# Patient Record
Sex: MALE | Race: WHITE | NOT HISPANIC OR LATINO | Employment: FULL TIME | URBAN - METROPOLITAN AREA
[De-identification: names, ages, dates, MRNs, and addresses within clinical notes are randomized per-mention and may not be internally consistent; named-entity substitution may affect disease eponyms.]

---

## 2020-03-24 ENCOUNTER — VIRTUAL VISIT (OUTPATIENT)
Dept: FAMILY MEDICINE | Facility: OTHER | Age: 40
End: 2020-03-24

## 2020-03-24 NOTE — PROGRESS NOTES
"Date: 2020 09:06:44  Clinician: Lupe Wilson  Clinician NPI: [id number]  Patient: Isabel Argueta  Patient : 1980  Patient Address: [facility] [address], [place] [address]  Patient Phone: [phone]  Visit Protocol: URI  Patient Summary:  Isabel is a 39 year old ( : 1980 ) male who initiated a Visit for COVID-19 (Coronavirus) evaluation and screening. When asked the question \"Please sign me up to receive news, health information and promotions from [employer].\", Isabel responded \"No\".    Isabel states his symptoms started 1-2 days ago.   His symptoms consist of rhinitis, a sore throat, a cough, nasal congestion, and malaise.   Symptom details     Nasal secretions: The color of his mucus is green.    Cough: Isabel coughs every 5-10 minutes and his cough is not more bothersome at night. Phlegm comes into his throat when he coughs. He believes his cough is caused by post-nasal drip. The color of the phlegm is clear, green, and yellow.     Sore throat: Isabel reports having moderate throat pain (4-6 on a 10 point pain scale), does not have exudate on his tonsils, and can swallow liquids. The lymph nodes in his neck are not enlarged. A rash has not appeared on the skin since the sore throat started.      Isabel denies having ear pain, enlarged lymph nodes, facial pain or pressure, myalgias, wheezing, chills, teeth pain, headache, and fever. He also denies taking antibiotic medication for the symptoms and having recent facial or sinus surgery in the past 60 days. He is not experiencing dyspnea.   Precipitating events  Isabel is not sure if he has been exposed to someone with strep throat. He has not recently been exposed to someone with influenza. Isabel has been in close contact with the following high risk individuals: children under the age of 5.   Pertinent COVID-19 (Coronavirus) information  Isabel has not traveled internationally or to the areas where COVID-19 (Coronavirus) is widespread, including " cruise ship travel in the last 14 days before the start of his symptoms.   Isabel has not had a close contact with a laboratory-confirmed COVID-19 patient within 14 days of symptom onset. He also has not had a close contact with a suspected COVID-19 patient within 14 days of symptom onset.   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   Pertinent medical history  Isabel needs a return to work/school note.   Weight: 220 lbs   Isabel does not smoke or use smokeless tobacco.   Additional information as reported by the patient (free text): My chest hurts when I cough   Weight: 220 lbs    MEDICATIONS: Prilosec oral, ALLERGIES: NKDA  Clinician Response:  Dear Isabel,   Based on the information you have provided, you do have symptoms that are consistent with Coronavirus (COVID-19).   The coronavirus causes mild to severe respiratory illness with the most common symptoms including fever, cough and difficulty breathing. Unfortunately, many viruses cause similar symptoms and it can be difficult to distinguish between viruses, especially in mild cases, so we are presuming that anyone with cough or fever has coronavirus at this time.  Coronavirus/COVID-19 has reached the point of community spread in [place], meaning that we are finding the virus in people with no known exposure risk for estefanía the virus. Given the increasing commonness of coronavirus in the community we are no longer testing patients who are not critically ill.  If you are a health care worker, you should refer to your employee health office for instructions about testing and returning to work.  For everyone else who has cough or fever, you should assume you are infected with coronavirus. Since you will not be tested but have symptoms that may be consistent with coronavirus, the CDC recommends you stay in self-isolation until these three things have happened:    You have had no fever for at least 72 hours  (that is three full days of no fever without the use of medicine that reduces fevers)    AND   Other symptoms have improved (for example, when your cough or shortness of breath have improved)   AND   At least 7 days have passed since your symptoms first appeared.   How to Isolate:    Isolate yourself at home.   Do Not allow any visitors  Do Not go to work or school  Do Not go to [employer],  centers, shopping, or other public places.  Do Not shake hands.  Avoid close contact with others (hugging, kissing).   Protect Others:    Cover Your Mouth and Nose with a mask, disposable tissue or wash cloth to avoid spreading germs to others.  Wash your hands and face frequently with soap and water.   Managing Symptoms:    At this time, we primarily recommend Tylenol (Acetaminophen) for fever or pain. If you have liver or kidney problems, contact your primary care provider for instructions on use of tylenol. Adults can take 650 mg (two 325 mg pills) by mouth every 4-6 hours as needed OR 1,000 mg (two 500 mg pills) every 8 hours as needed. MAXIMUM DAILY DOSE: 3,000mg. For children, refer to dosing on bottle based on age or weight.   If you develop significant shortness of breath that prevents you from doing normal activities, please call 911 or proceed to the nearest emergency room and alert them immediately that you have been in self-isolation for possible coronavirus.   For more information about COVID19 and options for caring for yourself at home, please visit the CDC website at https://www.cdc.gov/coronavirus/2019-ncov/about/steps-when-sick.htmlFor more options for care at [facility], please visit our website at [email or web address]     Diagnosis: Cough  Diagnosis ICD: R05

## 2020-03-25 ENCOUNTER — RESULTS ONLY (OUTPATIENT)
Dept: LAB | Age: 40
End: 2020-03-25

## 2020-03-25 ENCOUNTER — OFFICE VISIT (OUTPATIENT)
Dept: URGENT CARE | Facility: URGENT CARE | Age: 40
End: 2020-03-25

## 2020-03-25 DIAGNOSIS — Z20.822 SUSPECTED COVID-19 VIRUS INFECTION: Primary | ICD-10-CM

## 2020-03-25 PROCEDURE — 99207 ZZC NO BILLABLE SERVICE THIS VISIT: CPT

## 2020-03-25 NOTE — PATIENT INSTRUCTIONS
[facility] Employee Health team will receive your Covid 19 test results in the next 1-4 days.  Once your results are received, Employee Health will call you with your test result and discuss your Return to Work timeline and criteria.

## 2020-03-27 LAB
COVID-19 VIRUS PCR TO MAYO - RESULT: NORMAL
SPECIMEN SOURCE: NORMAL

## 2020-04-28 LAB
SARS-COV-2 RNA SPEC QL NAA+PROBE: ABNORMAL
SPECIMEN SOURCE: ABNORMAL

## 2020-12-20 ENCOUNTER — HEALTH MAINTENANCE LETTER (OUTPATIENT)
Age: 40
End: 2020-12-20

## 2021-03-11 ENCOUNTER — OFFICE VISIT (OUTPATIENT)
Dept: FAMILY MEDICINE | Facility: CLINIC | Age: 41
End: 2021-03-11
Payer: COMMERCIAL

## 2021-03-11 VITALS
HEART RATE: 86 BPM | OXYGEN SATURATION: 97 % | DIASTOLIC BLOOD PRESSURE: 87 MMHG | SYSTOLIC BLOOD PRESSURE: 135 MMHG | TEMPERATURE: 97 F | WEIGHT: 226 LBS

## 2021-03-11 DIAGNOSIS — R00.2 PALPITATIONS: Primary | ICD-10-CM

## 2021-03-11 DIAGNOSIS — K21.9 GASTROESOPHAGEAL REFLUX DISEASE WITHOUT ESOPHAGITIS: ICD-10-CM

## 2021-03-11 DIAGNOSIS — Z13.220 LIPID SCREENING: ICD-10-CM

## 2021-03-11 PROCEDURE — 80061 LIPID PANEL: CPT | Performed by: FAMILY MEDICINE

## 2021-03-11 PROCEDURE — 36415 COLL VENOUS BLD VENIPUNCTURE: CPT | Performed by: FAMILY MEDICINE

## 2021-03-11 PROCEDURE — 99204 OFFICE O/P NEW MOD 45 MIN: CPT | Performed by: FAMILY MEDICINE

## 2021-03-11 PROCEDURE — 80053 COMPREHEN METABOLIC PANEL: CPT | Performed by: FAMILY MEDICINE

## 2021-03-11 PROCEDURE — 93000 ELECTROCARDIOGRAM COMPLETE: CPT | Performed by: FAMILY MEDICINE

## 2021-03-11 RX ORDER — OMEPRAZOLE 20 MG/1
20 TABLET, DELAYED RELEASE ORAL DAILY
COMMUNITY
End: 2021-03-11

## 2021-03-11 RX ORDER — OMEPRAZOLE 20 MG/1
20 TABLET, DELAYED RELEASE ORAL DAILY
Qty: 30 TABLET | Refills: 11 | Status: SHIPPED | OUTPATIENT
Start: 2021-03-11 | End: 2021-10-19

## 2021-03-11 SDOH — HEALTH STABILITY: MENTAL HEALTH: HOW OFTEN DO YOU HAVE A DRINK CONTAINING ALCOHOL?: NEVER

## 2021-03-11 SDOH — HEALTH STABILITY: MENTAL HEALTH: HOW OFTEN DO YOU HAVE 6 OR MORE DRINKS ON ONE OCCASION?: NEVER

## 2021-03-11 SDOH — HEALTH STABILITY: MENTAL HEALTH: HOW MANY STANDARD DRINKS CONTAINING ALCOHOL DO YOU HAVE ON A TYPICAL DAY?: NOT ASKED

## 2021-03-11 NOTE — PROGRESS NOTES
"    Assessment & Plan     Palpitations  Palpitations with arrhythmia Differential Dx includes atrial fibrillation and V. tach supraventricular tachycardia or premature ventricular contractions.  probability of a cardiac or coronary artery disease related arrhythmia is very low because of: atypical symptoms, age, lack of other lifestyle risk factors. Also patient can exercise strenuously without problems  I lean towards premature ventricular contractions as a diagnosis because of normal electrocardiogram and exam today    We will do electrolytes as a follow-up     - Comprehensive metabolic panel  - EKG 12-lead complete w/read - Clinics  - Leadless EKG Monitor 3 to 7 Days; Future    Lipid screening    - Lipid Profile (Chol, Trig, HDL, LDL calc)    Gastroesophageal reflux disease without esophagitis  Ok for intermittent use of omeprazole    - omeprazole (PRILOSEC OTC) 20 MG EC tablet; Take 1 tablet (20 mg) by mouth daily            Humberto Lopez MD  [facility]    Suha Hall is a 40 year old who presents for the following health issues     HPI       Chest Pain/Palpitations  Onset/Duration: 1 year  Description:   Location: entire chest  Progression of Symptoms: intermittent  Accompanying Signs & Symptoms:  Shortness of breath: no  Sweating: no  Nausea/vomiting: no  Lightheadedness: no  Palpitations: YES  Fever/Chills: no  Cough: no           Heartburn: no  Precipitating factors:   Worse with exertion: YES  Worse with deep breaths: no           Related to eating: no           Better with burping: no    Was looking on watch EKG and saw a \"large wave\"  Showed him a PVC example and that matches    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      Objective    There were " no vitals taken for this visit.  There is no height or weight on file to calculate BMI.  Physical Exam     General Appearance: Pleasant, alert, in no acute respiratory distress.  Psychiatric Exam: Alert - appropriate, normal affect    Symptoms at time of EKG: None   Rhythm: Normal sinus   Rate: Normal  Axis: Normal  Ectopy: None  Conduction: Normal  ST Segments/ T Waves: No ST-T wave changes and No acute ischemic changes  Q Waves: None  Comparison to prior: No old EKG available    Clinical Impression: normal EKG

## 2021-03-11 NOTE — NURSING NOTE
Chief Complaint   Patient presents with     Heart Problem     palpitations for about a year     initial /87 (BP Location: Right arm, Cuff Size: Adult Large)   Pulse 86   Temp 97  F (36.1  C) (Tympanic)   Wt 102.5 kg (226 lb)   SpO2 97%  There is no height or weight on file to calculate BMI.  BP completed using cuff size: large.   R arm      Health Maintenance that is potentially due pending provider review:  NONE    n/a    Hammad Osorio ma

## 2021-03-12 ENCOUNTER — MYC MEDICAL ADVICE (OUTPATIENT)
Dept: FAMILY MEDICINE | Facility: CLINIC | Age: 41
End: 2021-03-12

## 2021-03-12 LAB
ALBUMIN SERPL-MCNC: 4.4 G/DL (ref 3.4–5)
ALP SERPL-CCNC: 63 U/L (ref 40–150)
ALT SERPL W P-5'-P-CCNC: 112 U/L (ref 0–70)
ANION GAP SERPL CALCULATED.3IONS-SCNC: 3 MMOL/L (ref 3–14)
AST SERPL W P-5'-P-CCNC: 37 U/L (ref 0–45)
BILIRUB SERPL-MCNC: 0.5 MG/DL (ref 0.2–1.3)
BUN SERPL-MCNC: 19 MG/DL (ref 7–30)
CALCIUM SERPL-MCNC: 9.1 MG/DL (ref 8.5–10.1)
CHLORIDE SERPL-SCNC: 107 MMOL/L (ref 94–109)
CHOLEST SERPL-MCNC: 215 MG/DL
CO2 SERPL-SCNC: 29 MMOL/L (ref 20–32)
CREAT SERPL-MCNC: 1.18 MG/DL (ref 0.66–1.25)
GFR SERPL CREATININE-BSD FRML MDRD: 77 ML/MIN/{1.73_M2}
GLUCOSE SERPL-MCNC: 82 MG/DL (ref 70–99)
HDLC SERPL-MCNC: 28 MG/DL
LDLC SERPL CALC-MCNC: ABNORMAL MG/DL
NONHDLC SERPL-MCNC: 187 MG/DL
POTASSIUM SERPL-SCNC: 3.8 MMOL/L (ref 3.4–5.3)
PROT SERPL-MCNC: 7.9 G/DL (ref 6.8–8.8)
SODIUM SERPL-SCNC: 139 MMOL/L (ref 133–144)
TRIGL SERPL-MCNC: 472 MG/DL

## 2021-03-15 ENCOUNTER — ANCILLARY PROCEDURE (OUTPATIENT)
Dept: CARDIOLOGY | Facility: CLINIC | Age: 41
End: 2021-03-15
Attending: FAMILY MEDICINE
Payer: COMMERCIAL

## 2021-03-15 DIAGNOSIS — R00.2 PALPITATIONS: ICD-10-CM

## 2021-03-15 PROCEDURE — 93244 EXT ECG>48HR<7D REV&INTERPJ: CPT | Performed by: INTERNAL MEDICINE

## 2021-03-15 PROCEDURE — 93242 EXT ECG>48HR<7D RECORDING: CPT

## 2021-03-15 NOTE — PROGRESS NOTES
Per Dr. Lopez, patient to have 3 day Ziopatch monitor placed.  Diagnosis: palpitations  Monitor placed: Yes  Patient Instructed: Yes  Patient verbalized understanding: Yes  Holter # [id number]    Placed By Paty Cueto

## 2021-03-25 NOTE — PROGRESS NOTES
SUBJECTIVE:   CC: Isabel Argueta is an 40 year old male who presents for preventative health visit.     Patient has been advised of split billing requirements and indicates understanding: Yes  Healthy Habits:     Getting at least 3 servings of Calcium per day:  Yes    Bi-annual eye exam:  Yes    Dental care twice a year:  Yes    Sleep apnea or symptoms of sleep apnea:  None    Diet:  Other    Frequency of exercise:  2-3 days/week    Duration of exercise:  30-45 minutes    Taking medications regularly:  Yes    Medication side effects:  Not applicable    PHQ-2 Total Score: 0    Additional concerns today:  Yes      High triglycerides  Discussed dietary interventions  Recheck at the end of Ramadan    Today's PHQ-2 Score:   PHQ-2 ( 1999 Pfizer) 3/23/2021   Q1: Little interest or pleasure in doing things 0   Q2: Feeling down, depressed or hopeless 0   PHQ-2 Score 0   Q1: Little interest or pleasure in doing things Not at all   Q2: Feeling down, depressed or hopeless Not at all   PHQ-2 Score 0       Abuse: Current or Past(Physical, Sexual or Emotional)- No  Do you feel safe in your environment? Yes    Have you ever done Advance Care Planning? (For example, a Health Directive, POLST, or a discussion with a medical provider or your loved ones about your wishes):     Social History     Tobacco Use     Smoking status: Never Smoker     Smokeless tobacco: Never Used   Substance Use Topics     Alcohol use: Never     Frequency: Never     Binge frequency: Never     If you drink alcohol do you typically have >3 drinks per day or >7 drinks per week? Not applicable    No flowsheet data found.    Last PSA: No results found for: PSA    Reviewed orders with patient. Reviewed health maintenance and updated orders accordingly - Yes  Lab work is in process  Labs reviewed in EPIC  BP Readings from Last 3 Encounters:   03/26/21 123/81   03/11/21 135/87    Wt Readings from Last 3 Encounters:   03/26/21 102.6 kg (226 lb 3.2 oz)   03/11/21  "102.5 kg (226 lb)                  Patient Active Problem List   Diagnosis     Hypertriglyceridemia     No past surgical history on file.    Social History     Tobacco Use     Smoking status: Never Smoker     Smokeless tobacco: Never Used   Substance Use Topics     Alcohol use: Never     Frequency: Never     Binge frequency: Never     No family history on file.      Current Outpatient Medications   Medication Sig Dispense Refill     Omega-3 Fatty Acids (FISH OIL PO)        omeprazole (PRILOSEC OTC) 20 MG EC tablet Take 1 tablet (20 mg) by mouth daily 30 tablet 11       Reviewed and updated as needed this visit by clinical staff  Tobacco  Allergies  Meds              Reviewed and updated as needed this visit by Provider                    Review of Systems  10 point ROS of systems including Constitutional, Eyes, Respiratory, Cardiovascular, Gastroenterology, Genitourinary, Integumentary, Muscularskeletal, Psychiatric were all negative except for pertinent positives noted in my HPI.      OBJECTIVE:   /81   Pulse 66   Temp 96.5  F (35.8  C) (Tympanic)   Ht 1.842 m (6' 0.5\")   Wt 102.6 kg (226 lb 3.2 oz)   SpO2 99%   BMI 30.26 kg/m      Physical Exam    General Appearance: Pleasant, alert, in no acute respiratory distress.  Head Exam: Normal. Normocephalic, atraumatic. No sinus tenderness.  Eye Exam: Normal external eye, conjunctiva, lids. CASIE.  Ear Exam: Normal auditory canals and external ears. Non-tender.  Left TM-normal. Right TM-normal.  Neck Exam: Supple, no obvious thyroid enlargement  Chest/Respiratory Exam: Normal, comfortable, easy respirations. Chest wall normal. Lungs are clear to auscultation. No wheezing, crackles, or rhonchi.  Cardiovascular Exam: Regular rate and rhythm. No murmur, gallop, or rubs.  Musculoskeletal Exam: Back is non-tender, full ROM of upper and lower extremities.  Skin: no rash, warm and dry.    Neurologic Exam: Nonfocal, no tremor. Normal gait.  Psychiatric Exam: Alert " "- appropriate, normal affect      ASSESSMENT/PLAN:       ICD-10-CM    1. Routine general medical examination at a health care facility  Z00.00    2. Hypertriglyceridemia  E78.1 Lipid Profile (Chol, Trig, HDL, LDL calc)     **Comprehensive metabolic panel FUTURE 1yr       Patient has been advised of split billing requirements and indicates understanding: Yes  COUNSELING:   Reviewed preventive health counseling, as reflected in patient instructions       Regular exercise       Healthy diet/nutrition    Estimated body mass index is 30.26 kg/m  as calculated from the following:    Height as of this encounter: 1.842 m (6' 0.5\").    Weight as of this encounter: 102.6 kg (226 lb 3.2 oz).         He reports that he has never smoked. He has never used smokeless tobacco.      Counseling Resources:  ATP IV Guidelines  Pooled Cohorts Equation Calculator  FRAX Risk Assessment  ICSI Preventive Guidelines  Dietary Guidelines for Americans, 2010  USDA's MyPlate  ASA Prophylaxis  Lung CA Screening    Humberto Lopez MD  [facility]  "

## 2021-03-26 ENCOUNTER — OFFICE VISIT (OUTPATIENT)
Dept: FAMILY MEDICINE | Facility: CLINIC | Age: 41
End: 2021-03-26
Payer: COMMERCIAL

## 2021-03-26 VITALS
OXYGEN SATURATION: 99 % | HEART RATE: 66 BPM | TEMPERATURE: 96.5 F | BODY MASS INDEX: 29.98 KG/M2 | DIASTOLIC BLOOD PRESSURE: 81 MMHG | HEIGHT: 73 IN | SYSTOLIC BLOOD PRESSURE: 123 MMHG | WEIGHT: 226.2 LBS

## 2021-03-26 DIAGNOSIS — Z00.00 ROUTINE GENERAL MEDICAL EXAMINATION AT A HEALTH CARE FACILITY: Primary | ICD-10-CM

## 2021-03-26 DIAGNOSIS — E78.1 HYPERTRIGLYCERIDEMIA: ICD-10-CM

## 2021-03-26 PROCEDURE — 99395 PREV VISIT EST AGE 18-39: CPT | Performed by: FAMILY MEDICINE

## 2021-03-26 ASSESSMENT — MIFFLIN-ST. JEOR: SCORE: 1986.98

## 2021-03-31 ENCOUNTER — MYC MEDICAL ADVICE (OUTPATIENT)
Dept: FAMILY MEDICINE | Facility: CLINIC | Age: 41
End: 2021-03-31

## 2021-04-29 ENCOUNTER — MYC MEDICAL ADVICE (OUTPATIENT)
Dept: FAMILY MEDICINE | Facility: CLINIC | Age: 41
End: 2021-04-29

## 2021-05-07 ENCOUNTER — MYC MEDICAL ADVICE (OUTPATIENT)
Dept: FAMILY MEDICINE | Facility: CLINIC | Age: 41
End: 2021-05-07

## 2021-05-07 DIAGNOSIS — E78.1 HYPERTRIGLYCERIDEMIA: ICD-10-CM

## 2021-05-07 LAB
ALBUMIN SERPL-MCNC: 4 G/DL (ref 3.4–5)
ALP SERPL-CCNC: 59 U/L (ref 40–150)
ALT SERPL W P-5'-P-CCNC: 116 U/L (ref 0–70)
ANION GAP SERPL CALCULATED.3IONS-SCNC: 7 MMOL/L (ref 3–14)
AST SERPL W P-5'-P-CCNC: 36 U/L (ref 0–45)
BILIRUB SERPL-MCNC: 0.5 MG/DL (ref 0.2–1.3)
BUN SERPL-MCNC: 18 MG/DL (ref 7–30)
CALCIUM SERPL-MCNC: 8.8 MG/DL (ref 8.5–10.1)
CHLORIDE SERPL-SCNC: 105 MMOL/L (ref 94–109)
CHOLEST SERPL-MCNC: 224 MG/DL
CO2 SERPL-SCNC: 26 MMOL/L (ref 20–32)
CREAT SERPL-MCNC: 0.99 MG/DL (ref 0.66–1.25)
GFR SERPL CREATININE-BSD FRML MDRD: >90 ML/MIN/{1.73_M2}
GLUCOSE SERPL-MCNC: 107 MG/DL (ref 70–99)
HDLC SERPL-MCNC: 25 MG/DL
LDLC SERPL CALC-MCNC: ABNORMAL MG/DL
NONHDLC SERPL-MCNC: 199 MG/DL
POTASSIUM SERPL-SCNC: 3.8 MMOL/L (ref 3.4–5.3)
PROT SERPL-MCNC: 7.6 G/DL (ref 6.8–8.8)
SODIUM SERPL-SCNC: 138 MMOL/L (ref 133–144)
TRIGL SERPL-MCNC: 494 MG/DL

## 2021-05-07 PROCEDURE — 80053 COMPREHEN METABOLIC PANEL: CPT | Performed by: FAMILY MEDICINE

## 2021-05-07 PROCEDURE — 36415 COLL VENOUS BLD VENIPUNCTURE: CPT | Performed by: FAMILY MEDICINE

## 2021-05-07 PROCEDURE — 80061 LIPID PANEL: CPT | Performed by: FAMILY MEDICINE

## 2021-07-21 ENCOUNTER — ALLIED HEALTH/NURSE VISIT (OUTPATIENT)
Dept: NURSING | Facility: CLINIC | Age: 41
End: 2021-07-21
Payer: COMMERCIAL

## 2021-07-21 DIAGNOSIS — Z23 NEED FOR VACCINATION: Primary | ICD-10-CM

## 2021-07-21 PROCEDURE — 99207 PR NO CHARGE NURSE ONLY: CPT

## 2021-07-21 PROCEDURE — 90471 IMMUNIZATION ADMIN: CPT

## 2021-07-21 PROCEDURE — 90715 TDAP VACCINE 7 YRS/> IM: CPT

## 2021-12-19 ENCOUNTER — MYC MEDICAL ADVICE (OUTPATIENT)
Dept: FAMILY MEDICINE | Facility: CLINIC | Age: 41
End: 2021-12-19
Payer: COMMERCIAL

## 2021-12-20 ENCOUNTER — ALLIED HEALTH/NURSE VISIT (OUTPATIENT)
Dept: FAMILY MEDICINE | Facility: CLINIC | Age: 41
End: 2021-12-20
Payer: COMMERCIAL

## 2021-12-20 DIAGNOSIS — Z23 NEED FOR VACCINATION: Primary | ICD-10-CM

## 2021-12-20 PROCEDURE — 90471 IMMUNIZATION ADMIN: CPT

## 2021-12-20 PROCEDURE — 90632 HEPA VACCINE ADULT IM: CPT

## 2022-01-18 ENCOUNTER — HOSPITAL ENCOUNTER (EMERGENCY)
Facility: CLINIC | Age: 42
Discharge: HOME | End: 2022-01-18
Attending: EMERGENCY MEDICINE | Admitting: EMERGENCY MEDICINE
Payer: COMMERCIAL

## 2022-01-18 VITALS
WEIGHT: 225 LBS | RESPIRATION RATE: 16 BRPM | DIASTOLIC BLOOD PRESSURE: 100 MMHG | HEART RATE: 67 BPM | TEMPERATURE: 98 F | OXYGEN SATURATION: 100 % | BODY MASS INDEX: 30.1 KG/M2 | SYSTOLIC BLOOD PRESSURE: 130 MMHG

## 2022-01-18 DIAGNOSIS — S61.211A LACERATION OF LEFT INDEX FINGER WITHOUT DAMAGE TO NAIL, FOREIGN BODY PRESENCE UNSPECIFIED, INITIAL ENCOUNTER: ICD-10-CM

## 2022-01-18 PROCEDURE — 99283 EMERGENCY DEPT VISIT LOW MDM: CPT | Performed by: EMERGENCY MEDICINE

## 2022-01-18 PROCEDURE — 12001 RPR S/N/AX/GEN/TRNK 2.5CM/<: CPT | Performed by: EMERGENCY MEDICINE

## 2022-01-18 PROCEDURE — 250N000009 HC RX 250: Performed by: EMERGENCY MEDICINE

## 2022-01-18 PROCEDURE — 99283 EMERGENCY DEPT VISIT LOW MDM: CPT | Mod: 25 | Performed by: EMERGENCY MEDICINE

## 2022-01-18 PROCEDURE — 250N000009 HC RX 250

## 2022-01-18 RX ORDER — GINSENG 100 MG
CAPSULE ORAL ONCE
Status: COMPLETED | OUTPATIENT
Start: 2022-01-18 | End: 2022-01-18

## 2022-01-18 RX ORDER — LIDOCAINE HYDROCHLORIDE 10 MG/ML
INJECTION, SOLUTION INFILTRATION; PERINEURAL
Status: COMPLETED
Start: 2022-01-18 | End: 2022-01-18

## 2022-01-18 RX ADMIN — LIDOCAINE HYDROCHLORIDE: 10 INJECTION, SOLUTION INFILTRATION; PERINEURAL at 22:30

## 2022-01-18 RX ADMIN — BACITRACIN: 500 OINTMENT TOPICAL at 22:44

## 2022-01-19 ASSESSMENT — ENCOUNTER SYMPTOMS
FEVER: 0
WOUND: 1
ABDOMINAL PAIN: 0
SHORTNESS OF BREATH: 0

## 2022-01-19 NOTE — ED TRIAGE NOTES
Pt presents to the ED after attempting to cut open a vacuum sealed container with a knife around 1830 when the knife slipped and cut the tip of his L index finger.

## 2022-01-19 NOTE — ED PROVIDER NOTES
[facility] EMERGENCY DEPARTMENT ([facility])    1/18/22    ED03  History     Chief Complaint   Patient presents with     Laceration     just before coming in cut tip of left index finger with a knife trying to open a bag of steak, bleeding controlled with pressure dressing, last tetanus in July     The history is provided by the patient.     Isabel Argueta is a 41 year old otherwise healthy male who presents to the ED for an evaluation of left index finger laceration. Patient reports that he was trying to open a bag of steak with a knife and unintentionally cut the tip of his left index finger. Patient's last tenus was in July 2021.     Past Medical History  History reviewed. No pertinent past medical history.  History reviewed. No pertinent surgical history.  Omega-3 Fatty Acids (FISH OIL PO)  omeprazole (PRILOSEC OTC) 20 MG EC tablet      No Known Allergies  Family History  History reviewed. No pertinent family history.  Social History   Social History     Tobacco Use     Smoking status: Never Smoker     Smokeless tobacco: Never Used   Substance Use Topics     Alcohol use: Never     Drug use: Never      Past medical history, past surgical history, medications, allergies, family history, and social history were reviewed with the patient. No additional pertinent items.       Review of Systems   Constitutional: Negative for fever.   Respiratory: Negative for shortness of breath.    Cardiovascular: Negative for chest pain.   Gastrointestinal: Negative for abdominal pain.   Skin: Positive for wound.   All other systems reviewed and are negative.    A complete review of systems was performed with pertinent positives and negatives noted in the HPI, and all other systems negative.    Physical Exam   BP: 126/83  Pulse: 83  Temp: 98  F (36.7  C)  Resp: 16  Weight: 102.1 kg (225 lb)  SpO2: 95 %  Physical Exam  Vitals and nursing note reviewed.   Constitutional:       General: He is not in acute distress.      Appearance: He is well-developed.   HENT:      Head: Normocephalic.   Eyes:      Extraocular Movements: Extraocular movements intact.   Pulmonary:      Effort: No respiratory distress.   Musculoskeletal:         General: No deformity.      Cervical back: Neck supple.   Skin:     General: Skin is dry.      Comments: Left distal index finger 1 cm partial avulsion laceration on the palmar aspect   Neurological:      Mental Status: He is alert.      Comments: Oriented   Psychiatric:         Behavior: Behavior normal.         ED Course      Procedures  [facility] Procedure Note        Laceration Repair:    Performed by: Dewey Lancaster DO  Authorized by: Dewey Lancaster DO  Consent given by: Patient who states understanding of the procedure being performed after discussing the risks, benefits and alternatives.    Preparation: Patient was prepped and draped in usual sterile fashion.  Irrigation solution: saline    Body area: Left second finger  Laceration length: 1cm  Contamination: The wound is not contaminated.  Foreign bodies:none  Tendon involvement: none  Anesthesia: Local  Local anesthetic: Lidocaine     1%  Anesthetic total: 1ml    Debridement: none  Skin closure: Closed with with 3 x  5.0 Vicryl Sutures  Technique: interrupted  Approximation: close  Approximation difficulty: simple    Patient tolerance: Patient tolerated the procedure well with no immediate complications.           No results found for any visits on 01/18/22.  Medications   lidocaine 1 % injection (  Given by Other Clinician 1/18/22 2230)   bacitracin ointment ( Topical Given 1/18/22 2244)        Assessments & Plan (with Medical Decision Making)   5-year-old male presents to us with a chief complaint of laceration of his fingertip.  There is a pulsatile or avulsion but it was able to be sutured.  Advised him on wound care and recommend he follow-up with primary care as needed.    I have reviewed the nursing notes. I have reviewed  the findings, diagnosis, plan and need for follow up with the patient.    Discharge Medication List as of 1/18/2022 10:47 PM          Final diagnoses:   Laceration of left index finger without damage to nail, foreign body presence unspecified, initial encounter       --  I, Karmen Murrell, am serving as a trained medical scribe to document services personally performed by Dewey Lancaster DO, based on the provider's statements to me.     Dewey REINOSO DO, was physically present and have reviewed and verified the accuracy of this note documented by Karmen Murrell.    Dewey Lancaster DO  [facility] EMERGENCY DEPARTMENT  1/18/2022     Dewey Lancaster DO  01/19/22 0030

## 2022-05-15 ENCOUNTER — HEALTH MAINTENANCE LETTER (OUTPATIENT)
Age: 42
End: 2022-05-15
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
## 2022-06-02 ENCOUNTER — LAB (OUTPATIENT)
Dept: LAB | Facility: CLINIC | Age: 42
End: 2022-06-02
Payer: COMMERCIAL

## 2022-06-02 ENCOUNTER — OFFICE VISIT (OUTPATIENT)
Dept: FAMILY MEDICINE | Facility: CLINIC | Age: 42
End: 2022-06-02

## 2022-06-02 VITALS
RESPIRATION RATE: 16 BRPM | SYSTOLIC BLOOD PRESSURE: 130 MMHG | HEART RATE: 89 BPM | WEIGHT: 231.5 LBS | BODY MASS INDEX: 29.71 KG/M2 | DIASTOLIC BLOOD PRESSURE: 84 MMHG | OXYGEN SATURATION: 97 % | HEIGHT: 74 IN | TEMPERATURE: 97.9 F

## 2022-06-02 DIAGNOSIS — R42 LIGHTHEADEDNESS: Primary | ICD-10-CM

## 2022-06-02 DIAGNOSIS — Z83.3 FAMILY HISTORY OF DIABETES MELLITUS: ICD-10-CM

## 2022-06-02 DIAGNOSIS — J30.2 SEASONAL ALLERGIC RHINITIS, UNSPECIFIED TRIGGER: ICD-10-CM

## 2022-06-02 DIAGNOSIS — R42 LIGHTHEADEDNESS: ICD-10-CM

## 2022-06-02 LAB
BASOPHILS # BLD AUTO: 0 10E3/UL (ref 0–0.2)
BASOPHILS NFR BLD AUTO: 0 %
EOSINOPHIL # BLD AUTO: 0.3 10E3/UL (ref 0–0.7)
EOSINOPHIL NFR BLD AUTO: 7 %
ERYTHROCYTE [DISTWIDTH] IN BLOOD BY AUTOMATED COUNT: 12.9 % (ref 10–15)
HBA1C MFR BLD: 5.4 % (ref 0–5.6)
HCT VFR BLD AUTO: 47.8 % (ref 40–53)
HGB BLD-MCNC: 16.2 G/DL (ref 13.3–17.7)
IRON SATN MFR SERPL: 18 % (ref 15–46)
IRON SERPL-MCNC: 62 UG/DL (ref 35–180)
LYMPHOCYTES # BLD AUTO: 2.4 10E3/UL (ref 0.8–5.3)
LYMPHOCYTES NFR BLD AUTO: 48 %
MCH RBC QN AUTO: 29.6 PG (ref 26.5–33)
MCHC RBC AUTO-ENTMCNC: 33.9 G/DL (ref 31.5–36.5)
MCV RBC AUTO: 87 FL (ref 78–100)
MONOCYTES # BLD AUTO: 0.5 10E3/UL (ref 0–1.3)
MONOCYTES NFR BLD AUTO: 9 %
NEUTROPHILS # BLD AUTO: 1.8 10E3/UL (ref 1.6–8.3)
NEUTROPHILS NFR BLD AUTO: 36 %
PLATELET # BLD AUTO: 260 10E3/UL (ref 150–450)
RBC # BLD AUTO: 5.47 10E6/UL (ref 4.4–5.9)
TIBC SERPL-MCNC: 353 UG/DL (ref 240–430)
TSH SERPL DL<=0.005 MIU/L-ACNC: 2.21 MU/L (ref 0.4–4)
VIT B12 SERPL-MCNC: 768 PG/ML (ref 193–986)
WBC # BLD AUTO: 5 10E3/UL (ref 4–11)

## 2022-06-02 PROCEDURE — 85025 COMPLETE CBC W/AUTO DIFF WBC: CPT

## 2022-06-02 PROCEDURE — 82607 VITAMIN B-12: CPT

## 2022-06-02 PROCEDURE — 83550 IRON BINDING TEST: CPT

## 2022-06-02 PROCEDURE — 99214 OFFICE O/P EST MOD 30 MIN: CPT | Performed by: NURSE PRACTITIONER

## 2022-06-02 PROCEDURE — 83036 HEMOGLOBIN GLYCOSYLATED A1C: CPT

## 2022-06-02 PROCEDURE — 84443 ASSAY THYROID STIM HORMONE: CPT

## 2022-06-02 PROCEDURE — 36415 COLL VENOUS BLD VENIPUNCTURE: CPT

## 2022-06-02 RX ORDER — MULTIPLE VITAMINS W/ MINERALS TAB 9MG-400MCG
1 TAB ORAL DAILY
COMMUNITY
End: 2023-02-04

## 2022-06-02 RX ORDER — MAGNESIUM OXIDE 400 MG/1
400 TABLET ORAL DAILY
COMMUNITY
End: 2023-01-20

## 2022-06-02 RX ORDER — FLUTICASONE PROPIONATE 50 MCG
2 SPRAY, SUSPENSION (ML) NASAL DAILY
Qty: 11.1 ML | Refills: 3 | Status: SHIPPED | OUTPATIENT
Start: 2022-06-02 | End: 2022-07-21

## 2022-06-06 ENCOUNTER — MYC MEDICAL ADVICE (OUTPATIENT)
Dept: FAMILY MEDICINE | Facility: CLINIC | Age: 42
End: 2022-06-06
Payer: COMMERCIAL

## 2022-06-06 DIAGNOSIS — R00.0 TACHYCARDIA: Primary | ICD-10-CM

## 2022-06-09 ENCOUNTER — ANCILLARY PROCEDURE (OUTPATIENT)
Dept: CARDIOLOGY | Facility: CLINIC | Age: 42
End: 2022-06-09
Attending: NURSE PRACTITIONER
Payer: COMMERCIAL

## 2022-06-09 DIAGNOSIS — R00.0 TACHYCARDIA: ICD-10-CM

## 2022-06-09 PROCEDURE — 93227 XTRNL ECG REC<48 HR R&I: CPT | Performed by: INTERNAL MEDICINE

## 2022-06-09 PROCEDURE — 93225 XTRNL ECG REC<48 HRS REC: CPT

## 2022-06-09 NOTE — PROGRESS NOTES
Per Bess Gongora, patient to have 48-hour Holter monitor placed.  Diagnosis: tachycardia  Monitor placed: Yes  Patient Instructed: Yes  Patient verbalized understanding: Yes  Holter # 3    Monitor was placed by SUJIT Beatty.  7:31 AM

## 2022-06-13 NOTE — TELEPHONE ENCOUNTER
Routed to POD    See pt Mychart message     Mychart message to pt    Calli Ambriz RN   [facility]

## 2022-06-27 ENCOUNTER — ALLIED HEALTH/NURSE VISIT (OUTPATIENT)
Dept: FAMILY MEDICINE | Facility: CLINIC | Age: 42
End: 2022-06-27
Payer: COMMERCIAL

## 2022-06-27 DIAGNOSIS — Z23 NEED FOR VACCINATION: Primary | ICD-10-CM

## 2022-06-27 PROCEDURE — 90632 HEPA VACCINE ADULT IM: CPT

## 2022-06-27 PROCEDURE — 90471 IMMUNIZATION ADMIN: CPT

## 2022-06-27 NOTE — PROGRESS NOTES
After obtaining informed consent the immunization is given by Arnoldo Randall MA  Patient tolerated hep A 2nd dose vaccine  administration.

## 2022-07-21 ENCOUNTER — OFFICE VISIT (OUTPATIENT)
Dept: FAMILY MEDICINE | Facility: CLINIC | Age: 42
End: 2022-07-21
Payer: COMMERCIAL

## 2022-07-21 VITALS
WEIGHT: 233.5 LBS | OXYGEN SATURATION: 97 % | SYSTOLIC BLOOD PRESSURE: 124 MMHG | HEART RATE: 72 BPM | BODY MASS INDEX: 31.63 KG/M2 | DIASTOLIC BLOOD PRESSURE: 82 MMHG | RESPIRATION RATE: 18 BRPM | TEMPERATURE: 98 F | HEIGHT: 72 IN

## 2022-07-21 DIAGNOSIS — Z13.220 SCREENING FOR HYPERLIPIDEMIA: ICD-10-CM

## 2022-07-21 DIAGNOSIS — Z00.00 ROUTINE HISTORY AND PHYSICAL EXAMINATION OF ADULT: Primary | ICD-10-CM

## 2022-07-21 PROCEDURE — 99396 PREV VISIT EST AGE 40-64: CPT | Performed by: NURSE PRACTITIONER

## 2022-07-21 RX ORDER — LORATADINE 10 MG/1
10 TABLET ORAL DAILY
COMMUNITY
End: 2023-02-22

## 2022-07-21 ASSESSMENT — ENCOUNTER SYMPTOMS
HEARTBURN: 0
HEMATOCHEZIA: 0
COUGH: 0
HEADACHES: 0
DYSURIA: 0
EYE PAIN: 0
ABDOMINAL PAIN: 0
WEAKNESS: 0
DIZZINESS: 0
PALPITATIONS: 0
CONSTIPATION: 0
HEMATURIA: 0
SHORTNESS OF BREATH: 0
NERVOUS/ANXIOUS: 0
FEVER: 0
ARTHRALGIAS: 0
MYALGIAS: 0
JOINT SWELLING: 0
NAUSEA: 0
SORE THROAT: 0
DIARRHEA: 0
FREQUENCY: 0

## 2022-07-21 ASSESSMENT — PAIN SCALES - GENERAL: PAINLEVEL: NO PAIN (0)

## 2022-07-21 NOTE — PROGRESS NOTES
Answers for HPI/ROS submitted by the patient on 7/21/2022  Frequency of exercise:: 2-3 days/week  Getting at least 3 servings of Calcium per day:: Yes  Diet:: Other  Taking medications regularly:: Yes  Medication side effects:: None  Bi-annual eye exam:: Yes  Dental care twice a year:: Yes  Sleep apnea or symptoms of sleep apnea:: None  Additional concerns today:: No  Duration of exercise:: 15-30 minutes    SUBJECTIVE:   CC: Isabel Argueta is an 42 year old male who presents for preventative health visit.           Today's PHQ-2 Score:   PHQ-2 ( 1999 [employer]) 7/21/2022   Q1: Little interest or pleasure in doing things 0   Q2: Feeling down, depressed or hopeless 0   PHQ-2 Score 0   PHQ-2 Total Score (12-17 Years)- Positive if 3 or more points; Administer PHQ-A if positive -   Q1: Little interest or pleasure in doing things Not at all   Q2: Feeling down, depressed or hopeless Not at all   PHQ-2 Score 0       Social History     Tobacco Use     Smoking status: Never Smoker     Smokeless tobacco: Never Used   Substance Use Topics     Alcohol use: Never         Alcohol Use 7/21/2022   Prescreen: >3 drinks/day or >7 drinks/week? No   Prescreen: >3 drinks/day or >7 drinks/week? -       Last PSA: No results found for: PSA    Reviewed orders with patient. Reviewed health maintenance and updated orders accordingly - Yes  Lab work is in process  Labs reviewed in EPIC    Reviewed and updated as needed this visit by clinical staff   Tobacco  Allergies  Meds   Med Hx  Surg Hx  Fam Hx  Soc Hx          Reviewed and updated as needed this visit by Provider   Tobacco     Med Hx  Surg Hx  Fam Hx  Soc Hx             Review of Systems   Constitutional: Negative for fever.   HENT: Negative for congestion, ear pain, hearing loss and sore throat.    Eyes: Negative for pain and visual disturbance.   Respiratory: Negative for cough and shortness of breath.    Cardiovascular: Negative for chest pain, palpitations and peripheral  "edema.   Gastrointestinal: Negative for abdominal pain, constipation, diarrhea, heartburn, hematochezia and nausea.   Genitourinary: Negative for dysuria, frequency, genital sores, hematuria, impotence, penile discharge and urgency.   Musculoskeletal: Negative for arthralgias, joint swelling and myalgias.   Skin: Negative for rash.   Neurological: Negative for dizziness, weakness and headaches.   Psychiatric/Behavioral: Negative for mood changes. The patient is not nervous/anxious.        OBJECTIVE:   /82 (BP Location: Left arm, Patient Position: Sitting, Cuff Size: Adult Regular)   Pulse 72   Temp 98  F (36.7  C) (Temporal)   Resp 18   Ht 1.82 m (5' 11.65\")   Wt 105.9 kg (233 lb 8 oz)   SpO2 97%   BMI 31.97 kg/m      Physical Exam  GENERAL: healthy, alert and no distress  EYES: Eyes grossly normal to inspection, PERRL and conjunctivae and sclerae normal  NECK: no adenopathy, no asymmetry, masses, or scars and thyroid normal to palpation  RESP: lungs clear to auscultation - no rales, rhonchi or wheezes  CV: regular rate and rhythm, normal S1 S2, no S3 or S4, no murmur, click or rub, no peripheral edema and peripheral pulses strong  ABDOMEN: soft, nontender, no hepatosplenomegaly, no masses and bowel sounds normal  MS: no gross musculoskeletal defects noted, no edema  SKIN: no suspicious lesions or rashes  NEURO: Normal strength and tone, mentation intact and speech normal  PSYCH: mentation appears normal, affect normal/bright    Diagnostic Test Results:  Labs reviewed in Epic  No results found for this or any previous visit (from the past 24 hour(s)).    ASSESSMENT/PLAN:       ICD-10-CM    1. Routine history and physical examination of adult  Z00.00      Feeling well; no current concerns      COUNSELING:   Reviewed preventive health counseling, as reflected in patient instructions       Regular exercise       Healthy diet/nutrition    Estimated body mass index is 31.97 kg/m  as calculated from the " "following:    Height as of this encounter: 1.82 m (5' 11.65\").    Weight as of this encounter: 105.9 kg (233 lb 8 oz).     Weight management plan: Discussed healthy diet and exercise guidelines    He reports that he has never smoked. He has never used smokeless tobacco.      Counseling Resources:  ATP IV Guidelines  Pooled Cohorts Equation Calculator  FRAX Risk Assessment  ICSI Preventive Guidelines  Dietary Guidelines for Americans, 2010  USDA's MyPlate  ASA Prophylaxis  Lung CA Screening    BULMARO Shelton CNP  [facility]  "

## 2022-08-28 ENCOUNTER — HOSPITAL ENCOUNTER (EMERGENCY)
Facility: CLINIC | Age: 42
Discharge: HOME | End: 2022-08-28
Attending: EMERGENCY MEDICINE | Admitting: EMERGENCY MEDICINE
Payer: COMMERCIAL

## 2022-08-28 VITALS
DIASTOLIC BLOOD PRESSURE: 91 MMHG | TEMPERATURE: 98.7 F | SYSTOLIC BLOOD PRESSURE: 139 MMHG | OXYGEN SATURATION: 98 % | HEART RATE: 86 BPM | RESPIRATION RATE: 16 BRPM

## 2022-08-28 DIAGNOSIS — S61.311A LACERATION OF LEFT INDEX FINGER WITHOUT FOREIGN BODY WITH DAMAGE TO NAIL, INITIAL ENCOUNTER: ICD-10-CM

## 2022-08-28 PROCEDURE — 99282 EMERGENCY DEPT VISIT SF MDM: CPT | Performed by: EMERGENCY MEDICINE

## 2022-08-28 ASSESSMENT — ACTIVITIES OF DAILY LIVING (ADL): ADLS_ACUITY_SCORE: 35

## 2022-08-28 ASSESSMENT — ENCOUNTER SYMPTOMS: WOUND: 1

## 2022-08-28 NOTE — DISCHARGE INSTRUCTIONS
Please leave this dressing on until Tuesday morning.  When it is time to change the dressing make sure that you soak the gauze, remove it carefully to try to prevent pulling off any clot.  If it does rebleed you can use the clotting gauze and rewrap it.  Keep the wound clean with soap and water.  Watch for any signs of infection.  Keep the wound covered in a bacitracin and bandage until it is healed.

## 2022-08-28 NOTE — ED TRIAGE NOTES
Triage Assessment     Row Name 08/28/22 1559       Triage Assessment (Adult)    Airway WDL WDL       Respiratory WDL    Respiratory WDL WDL       Skin Circulation/Temperature WDL    Skin Circulation/Temperature WDL WDL       Cardiac WDL    Cardiac WDL WDL       Peripheral/Neurovascular WDL    Peripheral Neurovascular WDL WDL       Cognitive/Neuro/Behavioral WDL    Cognitive/Neuro/Behavioral WDL WDL

## 2022-08-28 NOTE — ED PROVIDER NOTES
ED Provider Note  [facility] EMERGENCY DEPARTMENT ([facility])    8/28/22          History     Chief Complaint   Patient presents with     Laceration     Left index finger: shaving with razor; still bleeding     HPI  Isabel Argueta is a 42 year old otherwise healthy male who presents to the ED for evaluation of left index finger laceration.  Patient was shaving with a razor 2 nights ago when he cut his left second finger.  He bandaged it himself.  He states that it bleeds when he takes the bandage off.  He denies LOC or other injuries.  He denies any history of diabetes or hypertension.    Per [facility] records, patient's last Tdap was 07/21/2021.    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    Past Medical History  Past Medical History:   Diagnosis Date     Palpitations      No past surgical history on file.  loratadine (CLARITIN) 10 MG tablet  magnesium oxide (MAGNESIUM OXIDE) 400 MG tablet  multivitamin w/minerals (THERA-VIT-M) tablet  Omega-3 Fatty Acids (FISH OIL PO)  omeprazole (PRILOSEC OTC) 20 MG EC tablet      Allergies   Allergen Reactions     Dust Mites      Family History  Family History   Problem Relation Age of Onset     Diabetes Mother      Thyroid Cancer Mother      Hypertension Father      Colon Polyps Father      No Known Problems Brother      No Known Problems Brother      Glaucoma Brother      No Known Problems Maternal Grandmother      No Known Problems Maternal Grandfather      No Known Problems Paternal Grandmother      No Known Problems Paternal Grandfather      Diabetes Type 2  Daughter      No Known Problems Daughter      No Known Problems Daughter      No Known Problems Daughter      No Known Problems Daughter      No Known Problems Son      No Known Problems Son      No Known Problems Son      Social History   Social History     Tobacco Use     Smoking status: Never Smoker     Smokeless tobacco: Never Used   Substance Use Topics      Alcohol use: Never     Drug use: Never      Past medical history, past surgical history, medications, allergies, family history, and social history were reviewed with the patient. No additional pertinent items.       Review of Systems   Skin: Positive for wound.   All other systems reviewed and are negative.    A complete review of systems was performed with pertinent positives and negatives noted in the HPI, and all other systems negative.    Physical Exam   BP: (!) 139/91  Pulse: 86  Temp: 98.7  F (37.1  C)  Resp: 16  SpO2: 98 %  Physical Exam  General: Awake alert no acute distress  Lungs: Breathing comfortably on room air  CV: Normal rate  Extremity: Patient's left index finger has a shave injury where the tip of the finger and tip of the nail have been shaved off.  There is no flap or repairable injury.  No foreign body.    ED Course     4:06 PM  The patient was seen and examined by Ulises Linn MD in Room ED06.     [facility]    Wound Care    Date/Time: 8/28/2022 4:47 PM  Performed by: Ulises Linn MD  Authorized by: Ulises Linn MD     Risks, benefits and alternatives discussed.      ANESTHESIA (see MAR for exact dosages):     Anesthesia method:  None    PROCEDURE DETAILS     Indications: open wounds      Wound age (days):  2    Debridement level: skin, partial thickness      Wound surface area (sq cm):  1    DRESSING      Dressing applied:  Kerlix (Hemostatic gauze)    Wrapped with:  Bulky dressing      PROCEDURE  Describe Procedure: Wound is irrigated with iodine and water.  Hemostatic gauze was applied to the open wound.  A compression dressing with gauze and Coban was applied to the tip of the finger.  Patient Tolerance:  Patient tolerated the procedure well with no immediate complications         The medical record was reviewed and interpreted.              No results found for any visits on 08/28/22.  Medications - No data to display      Assessments & Plan (with Medical Decision Making)   Isabel is a 42-year-old male who presents with an injury to his left index finger.  It is a shave injury has shift off the tip of finger there is no repairable laceration.  This does affect the nailbed.  This happened 2 days ago.  The wound was irrigated with iodine and saline.  A dressing with hemostatic gauze was placed and Coban for pressure.  Patient was observed to make sure the dressing remained hemostatic.  He was given basic wound care instructions.  Tetanus not indicated.  Instructed to watch for signs or symptoms of infection.    I have reviewed the nursing notes. I have reviewed the findings, diagnosis, plan and need for follow up with the patient.    New Prescriptions    No medications on file       Final diagnoses:   Laceration of left index finger without foreign body with damage to nail, initial encounter     I, Rachelle Douglas, am serving as a trained medical scribe to document services personally performed by Ulises Linn MD based on the provider's statements to me on August 28, 2022.  This document has been checked and approved by the attending provider.    I, Ulises Linn MD, was physically present and have reviewed and verified the accuracy of this note documented by Rachelle Douglas medical scribe.        --    formerly [facility] EMERGENCY DEPARTMENT  8/28/2022     Ulises Linn MD  08/28/22 3630

## 2022-09-11 ENCOUNTER — HEALTH MAINTENANCE LETTER (OUTPATIENT)
Age: 42
End: 2022-09-11

## 2022-10-14 ENCOUNTER — MYC MEDICAL ADVICE (OUTPATIENT)
Dept: FAMILY MEDICINE | Facility: CLINIC | Age: 42
End: 2022-10-14

## 2023-01-19 ENCOUNTER — OFFICE VISIT (OUTPATIENT)
Dept: FAMILY MEDICINE | Facility: CLINIC | Age: 43
End: 2023-01-19
Payer: COMMERCIAL

## 2023-01-19 VITALS
RESPIRATION RATE: 20 BRPM | HEIGHT: 73 IN | DIASTOLIC BLOOD PRESSURE: 74 MMHG | TEMPERATURE: 96.2 F | OXYGEN SATURATION: 97 % | WEIGHT: 237.5 LBS | SYSTOLIC BLOOD PRESSURE: 128 MMHG | BODY MASS INDEX: 31.48 KG/M2 | HEART RATE: 75 BPM

## 2023-01-19 DIAGNOSIS — M25.562 ACUTE PAIN OF LEFT KNEE: Primary | ICD-10-CM

## 2023-01-19 PROCEDURE — 99213 OFFICE O/P EST LOW 20 MIN: CPT | Performed by: FAMILY MEDICINE

## 2023-01-19 ASSESSMENT — PAIN SCALES - GENERAL: PAINLEVEL: NO PAIN (0)

## 2023-01-19 NOTE — PROGRESS NOTES
"  Assessment & Plan     Acute pain of left knee  Patient is a healthy 42-year-old male with no significant orthopedic history.  He reports an injury in November.  Since then he has had significant knee pain that prevents him from doing all of his daily activities including kneeling to pray.  Exam and history are suggestive of a left medial meniscus tear.  Advised to do physical therapy for 6 weeks and get an MRI.  If pain worsens or if pain does not improve and MRI is positive for meniscal tear he will likely need to see an orthopedic surgeon to discuss further.  Advised patient to avoid activities that make the pain worse.  - MR Knee Right w/o Contrast; Future  - Physical Therapy Referral; Future      Follow-up in 6 weeks if no improvement or getting worse.    [facility] is a 42 year old presenting for the following health issues:    Knee Pain (Injured L knee around thanksgiving- shoe gripped well so when turned foot didn't turn with body-felt like \"something pulled or snapped\" few days after returning pain worsened, got better after wearing brace for about 2 weeks and stopped  using stairs, pain returned about a week ago like it was the original pain-using brace again- muscles around knee hurt as if they have been compensating for knee)      Knee Pain    History of Present Illness       Reason for visit:  Knee/joint pain that impacts walking  Symptom onset:  More than a month  Symptoms include:  Knee pain. I cannot go up and down stairs  Symptom intensity:  Severe  Symptom progression:  Staying the same  Had these symptoms before:  No  What makes it worse:  Bending the knee  What makes it better:  Knee brace and rest    He eats 2-3 servings of fruits and vegetables daily.He consumes 0 sweetened beverage(s) daily.He exercises with enough effort to increase his heart rate 9 or less minutes per day.  He exercises with enough effort to increase his heart " "rate 3 or less days per week.   He is taking medications regularly.     Left medial knee pain   Had a twisting injury around [place].  Since then it has been quite painful.  It was improving until about a week ago when he was slipping on the ice and had more pain  He has not been able to kneel down to pray because of the pain.  He has adapted to doing the sitting  He at times feels like there is a catching in the knee.  He hears a click at times.  Knee brace has been helpful with tenderness.  He is not done any other significant treatment.  He finds that rest helps the pain.  Denies leg giving out or falling.              Objective    /74 (BP Location: Right arm, Patient Position: Sitting, Cuff Size: Adult Regular)   Pulse 75   Temp (!) 96.2  F (35.7  C) (Temporal)   Resp 20   Ht 1.85 m (6' 0.84\")   Wt 107.7 kg (237 lb 8 oz)   SpO2 97%   BMI 31.48 kg/m    Body mass index is 31.48 kg/m .  Physical Exam  Constitutional:       General: He is not in acute distress.  Eyes:      General: No scleral icterus.  Pulmonary:      Effort: No respiratory distress.   Musculoskeletal:      Comments: Neoprene knee brace on left knee.  Fullness in popliteal fossa.  No effusion noted around patellar tendon.  Tenderness at medial joint line inferiorly.  Varus, valgus, and Lachman's negative.  Apley positive with pain at medial joint line with external rotation of tibia.   Neurological:      Mental Status: He is alert.   Psychiatric:         Mood and Affect: Mood normal.         Behavior: Behavior normal.                        "

## 2023-01-20 ENCOUNTER — TELEPHONE (OUTPATIENT)
Dept: FAMILY MEDICINE | Facility: CLINIC | Age: 43
End: 2023-01-20
Payer: COMMERCIAL

## 2023-01-20 ENCOUNTER — MYC MEDICAL ADVICE (OUTPATIENT)
Dept: FAMILY MEDICINE | Facility: CLINIC | Age: 43
End: 2023-01-20
Payer: COMMERCIAL

## 2023-01-20 DIAGNOSIS — M25.562 ACUTE PAIN OF LEFT KNEE: Primary | ICD-10-CM

## 2023-01-20 NOTE — TELEPHONE ENCOUNTER
T'd up new order for L knee MRI if appropriate to sign.     Marlen Bravo RN  [facility]

## 2023-01-23 ENCOUNTER — HOSPITAL ENCOUNTER (OUTPATIENT)
Dept: MRI IMAGING | Facility: CLINIC | Age: 43
Discharge: HOME | End: 2023-01-23
Attending: FAMILY MEDICINE | Admitting: FAMILY MEDICINE
Payer: COMMERCIAL

## 2023-01-23 DIAGNOSIS — M25.562 ACUTE PAIN OF LEFT KNEE: ICD-10-CM

## 2023-01-23 PROCEDURE — 73721 MRI JNT OF LWR EXTRE W/O DYE: CPT | Mod: LT

## 2023-01-23 PROCEDURE — 73721 MRI JNT OF LWR EXTRE W/O DYE: CPT | Mod: 26 | Performed by: RADIOLOGY

## 2023-01-26 ENCOUNTER — HOSPITAL ENCOUNTER (OUTPATIENT)
Dept: GENERAL RADIOLOGY | Facility: CLINIC | Age: 43
Discharge: HOME | End: 2023-01-26
Attending: FAMILY MEDICINE | Admitting: FAMILY MEDICINE
Payer: COMMERCIAL

## 2023-01-26 ENCOUNTER — OFFICE VISIT (OUTPATIENT)
Dept: FAMILY MEDICINE | Facility: CLINIC | Age: 43
End: 2023-01-26
Payer: COMMERCIAL

## 2023-01-26 VITALS
TEMPERATURE: 97.9 F | HEIGHT: 72 IN | HEART RATE: 78 BPM | BODY MASS INDEX: 32.23 KG/M2 | SYSTOLIC BLOOD PRESSURE: 119 MMHG | WEIGHT: 238 LBS | DIASTOLIC BLOOD PRESSURE: 80 MMHG | OXYGEN SATURATION: 98 % | RESPIRATION RATE: 16 BRPM

## 2023-01-26 DIAGNOSIS — R06.2 WHEEZING: ICD-10-CM

## 2023-01-26 DIAGNOSIS — R06.2 WHEEZING: Primary | ICD-10-CM

## 2023-01-26 PROCEDURE — 71046 X-RAY EXAM CHEST 2 VIEWS: CPT | Mod: 26 | Performed by: RADIOLOGY

## 2023-01-26 PROCEDURE — 71046 X-RAY EXAM CHEST 2 VIEWS: CPT

## 2023-01-26 PROCEDURE — 99214 OFFICE O/P EST MOD 30 MIN: CPT | Performed by: FAMILY MEDICINE

## 2023-01-26 RX ORDER — MULTIVITAMIN WITH IRON
1 TABLET ORAL DAILY
COMMUNITY
End: 2023-02-04

## 2023-01-26 RX ORDER — INHALER, ASSIST DEVICES
SPACER (EA) MISCELLANEOUS
Qty: 1 EACH | Refills: 11 | Status: SHIPPED | OUTPATIENT
Start: 2023-01-26 | End: 2023-03-09

## 2023-01-26 RX ORDER — ALBUTEROL SULFATE 90 UG/1
2 AEROSOL, METERED RESPIRATORY (INHALATION) EVERY 4 HOURS PRN
Qty: 18 G | Refills: 11 | Status: SHIPPED | OUTPATIENT
Start: 2023-01-26 | End: 2023-03-09

## 2023-01-26 RX ORDER — PREDNISONE 20 MG/1
40 TABLET ORAL EVERY MORNING
Qty: 10 TABLET | Refills: 0 | Status: SHIPPED | OUTPATIENT
Start: 2023-01-26 | End: 2023-02-22

## 2023-01-26 ASSESSMENT — PAIN SCALES - GENERAL: PAINLEVEL: NO PAIN (0)

## 2023-01-26 NOTE — PROGRESS NOTES
"Assessment/Plan    Wheezing. Suspect undiagnosed asthma. Suspect seasonal allergen trigger.  -chest XR  -start albuterol prn, stop Primatene  -start prednisone 40mg/d x 5d  -consider PFTs in 1-2 mos    Orders Placed This Encounter   Procedures     REVIEW OF HEALTH MAINTENANCE PROTOCOL ORDERS     XR Chest 2 Views     General PFT Lab (Please always keep checked)     Pulmonary Function Test      ----  Chief complaint: Wheezing    Social History     Social History Narrative    Name is pronounced like Jemma.        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     Never asthma as a kid. Had bronchitis and pneumonia. Was given neb treatment in his 20s.    Earlier in 1/2023, developed wheezing/whistling sound. Cough. Noted dyspnea around 1/20/23, 6d ago. No fever.    Primatene inhaler helps, but causes palpitations and tremor.    Wife had COVID in 12/2022. Pt tested negative.    Brothers w/ asthma. Pt smoked briefly in high school. No new pets. No known mold at home. Has air purifier.    Exam  /80 (BP Location: Left arm, Patient Position: Sitting, Cuff Size: Adult Large)   Pulse 78   Temp 97.9  F (36.6  C) (Temporal)   Resp 16   Ht 1.825 m (5' 11.85\")   Wt 108 kg (238 lb)   SpO2 98%   BMI 32.41 kg/m    Mild wheezes, most pronounced at lung bases b/l  Mild crackles at lung bases b/l  RRR, no murmurs  Inflamed nasal turbinates b/l  Oropharynx without abnormal masses  "

## 2023-01-27 NOTE — TELEPHONE ENCOUNTER
DIAGNOSIS: Acute pain of left knee    APPOINTMENT DATE: 2.6.23   NOTES STATUS DETAILS   OFFICE NOTE from referring provider Internal 1.19.23 Veto Valentin, DO   MEDICATION LIST Internal    MRI Internal 1.23.23 L knee

## 2023-01-28 PROBLEM — K76.0 FATTY LIVER: Status: ACTIVE | Noted: 2023-01-28

## 2023-01-28 PROBLEM — R06.2 WHEEZING: Status: ACTIVE | Noted: 2023-01-28

## 2023-01-28 RX ORDER — CYCLOSPORINE 0.5 MG/ML
1 EMULSION OPHTHALMIC PRN
COMMUNITY
Start: 2023-01-24

## 2023-02-04 ENCOUNTER — HOSPITAL ENCOUNTER (EMERGENCY)
Facility: CLINIC | Age: 43
Discharge: HOME | End: 2023-02-04
Attending: PHYSICIAN ASSISTANT | Admitting: PHYSICIAN ASSISTANT
Payer: COMMERCIAL

## 2023-02-04 ENCOUNTER — APPOINTMENT (OUTPATIENT)
Dept: GENERAL RADIOLOGY | Facility: CLINIC | Age: 43
End: 2023-02-04
Attending: EMERGENCY MEDICINE
Payer: COMMERCIAL

## 2023-02-04 VITALS
HEART RATE: 85 BPM | RESPIRATION RATE: 20 BRPM | TEMPERATURE: 97 F | OXYGEN SATURATION: 98 % | DIASTOLIC BLOOD PRESSURE: 83 MMHG | SYSTOLIC BLOOD PRESSURE: 150 MMHG

## 2023-02-04 DIAGNOSIS — R06.02 SOB (SHORTNESS OF BREATH): ICD-10-CM

## 2023-02-04 DIAGNOSIS — R05.9 COUGH, UNSPECIFIED TYPE: ICD-10-CM

## 2023-02-04 LAB
ANION GAP SERPL CALCULATED.3IONS-SCNC: 12 MMOL/L (ref 7–15)
ATRIAL RATE - MUSE: 86 BPM
BASOPHILS # BLD AUTO: 0 10E3/UL (ref 0–0.2)
BASOPHILS NFR BLD AUTO: 0 %
BUN SERPL-MCNC: 13.9 MG/DL (ref 6–20)
CALCIUM SERPL-MCNC: 9.2 MG/DL (ref 8.6–10)
CHLORIDE SERPL-SCNC: 97 MMOL/L (ref 98–107)
CREAT SERPL-MCNC: 1.14 MG/DL (ref 0.67–1.17)
D DIMER PPP FEU-MCNC: <0.27 UG/ML FEU (ref 0–0.5)
DEPRECATED HCO3 PLAS-SCNC: 26 MMOL/L (ref 22–29)
DIASTOLIC BLOOD PRESSURE - MUSE: NORMAL MMHG
EOSINOPHIL # BLD AUTO: 0.2 10E3/UL (ref 0–0.7)
EOSINOPHIL NFR BLD AUTO: 3 %
ERYTHROCYTE [DISTWIDTH] IN BLOOD BY AUTOMATED COUNT: 12.5 % (ref 10–15)
FLUAV RNA SPEC QL NAA+PROBE: NEGATIVE
FLUBV RNA RESP QL NAA+PROBE: NEGATIVE
GFR SERPL CREATININE-BSD FRML MDRD: 83 ML/MIN/1.73M2
GLUCOSE SERPL-MCNC: 92 MG/DL (ref 70–99)
HCT VFR BLD AUTO: 48.6 % (ref 40–53)
HGB BLD-MCNC: 16.3 G/DL (ref 13.3–17.7)
IMM GRANULOCYTES # BLD: 0 10E3/UL
IMM GRANULOCYTES NFR BLD: 0 %
INTERPRETATION ECG - MUSE: NORMAL
LYMPHOCYTES # BLD AUTO: 3.3 10E3/UL (ref 0.8–5.3)
LYMPHOCYTES NFR BLD AUTO: 44 %
MCH RBC QN AUTO: 29.3 PG (ref 26.5–33)
MCHC RBC AUTO-ENTMCNC: 33.5 G/DL (ref 31.5–36.5)
MCV RBC AUTO: 87 FL (ref 78–100)
MONOCYTES # BLD AUTO: 0.6 10E3/UL (ref 0–1.3)
MONOCYTES NFR BLD AUTO: 8 %
NEUTROPHILS # BLD AUTO: 3.4 10E3/UL (ref 1.6–8.3)
NEUTROPHILS NFR BLD AUTO: 45 %
NRBC # BLD AUTO: 0 10E3/UL
NRBC BLD AUTO-RTO: 0 /100
P AXIS - MUSE: 55 DEGREES
PLATELET # BLD AUTO: 282 10E3/UL (ref 150–450)
POTASSIUM SERPL-SCNC: 3.8 MMOL/L (ref 3.4–5.3)
PR INTERVAL - MUSE: 146 MS
QRS DURATION - MUSE: 76 MS
QT - MUSE: 386 MS
QTC - MUSE: 461 MS
R AXIS - MUSE: 36 DEGREES
RBC # BLD AUTO: 5.56 10E6/UL (ref 4.4–5.9)
RSV RNA SPEC NAA+PROBE: NEGATIVE
SARS-COV-2 RNA RESP QL NAA+PROBE: NEGATIVE
SODIUM SERPL-SCNC: 135 MMOL/L (ref 136–145)
SYSTOLIC BLOOD PRESSURE - MUSE: NORMAL MMHG
T AXIS - MUSE: 58 DEGREES
TROPONIN T SERPL HS-MCNC: <6 NG/L
VENTRICULAR RATE- MUSE: 86 BPM
WBC # BLD AUTO: 7.5 10E3/UL (ref 4–11)

## 2023-02-04 PROCEDURE — 250N000009 HC RX 250: Performed by: PHYSICIAN ASSISTANT

## 2023-02-04 PROCEDURE — 85379 FIBRIN DEGRADATION QUANT: CPT | Performed by: PHYSICIAN ASSISTANT

## 2023-02-04 PROCEDURE — 71046 X-RAY EXAM CHEST 2 VIEWS: CPT

## 2023-02-04 PROCEDURE — 87637 SARSCOV2&INF A&B&RSV AMP PRB: CPT | Performed by: EMERGENCY MEDICINE

## 2023-02-04 PROCEDURE — 87637 SARSCOV2&INF A&B&RSV AMP PRB: CPT | Performed by: PHYSICIAN ASSISTANT

## 2023-02-04 PROCEDURE — 85025 COMPLETE CBC W/AUTO DIFF WBC: CPT | Performed by: PHYSICIAN ASSISTANT

## 2023-02-04 PROCEDURE — 94640 AIRWAY INHALATION TREATMENT: CPT

## 2023-02-04 PROCEDURE — 36415 COLL VENOUS BLD VENIPUNCTURE: CPT | Performed by: PHYSICIAN ASSISTANT

## 2023-02-04 PROCEDURE — 82310 ASSAY OF CALCIUM: CPT | Performed by: PHYSICIAN ASSISTANT

## 2023-02-04 PROCEDURE — 84484 ASSAY OF TROPONIN QUANT: CPT | Performed by: PHYSICIAN ASSISTANT

## 2023-02-04 PROCEDURE — 99285 EMERGENCY DEPT VISIT HI MDM: CPT | Mod: CS,25

## 2023-02-04 PROCEDURE — C9803 HOPD COVID-19 SPEC COLLECT: HCPCS

## 2023-02-04 PROCEDURE — 93005 ELECTROCARDIOGRAM TRACING: CPT

## 2023-02-04 RX ORDER — ALBUTEROL SULFATE 0.83 MG/ML
5 SOLUTION RESPIRATORY (INHALATION) ONCE
Status: COMPLETED | OUTPATIENT
Start: 2023-02-04 | End: 2023-02-04

## 2023-02-04 RX ORDER — DOXYCYCLINE 100 MG/1
100 CAPSULE ORAL 2 TIMES DAILY
Qty: 10 CAPSULE | Refills: 0 | Status: SHIPPED | OUTPATIENT
Start: 2023-02-04 | End: 2023-02-09

## 2023-02-04 RX ADMIN — ALBUTEROL SULFATE 5 MG: 2.5 SOLUTION RESPIRATORY (INHALATION) at 20:44

## 2023-02-04 ASSESSMENT — ENCOUNTER SYMPTOMS
SHORTNESS OF BREATH: 1
RHINORRHEA: 1
SORE THROAT: 0

## 2023-02-04 ASSESSMENT — ACTIVITIES OF DAILY LIVING (ADL): ADLS_ACUITY_SCORE: 33

## 2023-02-05 NOTE — ED TRIAGE NOTES
Patient has had SOB and chills for about 2 weeks.  He saw his PCP and is being worked up for asthma.  He reports he started using an inhaler and some steroids and started feeling better.  He then started feeling SOB again.

## 2023-02-05 NOTE — DISCHARGE INSTRUCTIONS

## 2023-02-05 NOTE — ED PROVIDER NOTES
"  History     Chief Complaint:  Shortness of Breath       HPI   Isabel Argueta is a 42 year old male with a history of hypercholesterolemia, chronic rhinitis who presents with shortness of breath which onset on 1/21/23. He reports he had some wheezing a week ago which has mostly resolved. He now reports having some crackles. He reports some \"snapping\" in his nose. He has some chest pain on his right side while laying down. He also reports some rhinorrhea which is normal for this time of year. He also reports feeling fatigued and abnormal. He was seen my a family doctor for this on 1/26 and started on prednisone and Albuterol. No sore throat. No history of blood clots or recent travel.   History of Lasik surgery in august. No history of cancer     Independent Historian:   None - Patient Only    Review of External Notes: Office visit on 1/26/2023    ROS:  Review of Systems   HENT: Positive for rhinorrhea. Negative for sore throat.    Respiratory: Positive for shortness of breath.    Cardiovascular: Positive for chest pain.   All other systems reviewed and are negative.      Allergies:  Dust Mites     Medications:    Albuterol   Loratadine  Omeprazole     Past Medical History:    Chronic rhinitis  Blepharitis of both eyes  Chalazion of left upper eyelid  Chronic bronchitis, simple  Hypercholesterolemia   Fatty liver    Past Surgical History:    Chalazion excision   Eye surgery      Family History:    family history includes Asthma in his brother; Colon Polyps in his father; Diabetes in his mother; Diabetes Type 2  in his daughter; Glaucoma in his brother; Hypertension in his father; No Known Problems in his brother, daughter, daughter, daughter, daughter, maternal grandfather, maternal grandmother, paternal grandfather, paternal grandmother, son, son, and son; Thyroid Cancer in his mother.    Social History:   reports that he has never smoked. He has never used smokeless tobacco. He reports that he does not drink " alcohol and does not use drugs.  PCP: Bess Gongora     Physical Exam     Patient Vitals for the past 24 hrs:   BP Temp Temp src Pulse Resp SpO2   02/04/23 2129 (!) 150/83 -- -- 85 -- --   02/04/23 1806 (!) 151/92 97  F (36.1  C) Temporal 105 20 98 %        Physical Exam  General: Well appearing, pleasant male, resting on exam bed  HEENT: No evidence of trauma.  Conjunctive are clear.  Extraocular eye movements intact.  Neck range of motion intact.  Nose and throat clear.  Respiratory: Good breath sounds bilaterally  Cardiovascular: fast rate and rhythm   Gastrointestinal: Soft, nontender.   Musculoskeletal: Atraumatic  Skin: Exposed skin clear.  Neurologic: Alert.  Psych:  Patient is cooperative, with normal affect.      Emergency Department Course     ECG results from 02/04/23   EKG 12-lead, tracing only     Value    Systolic Blood Pressure     Diastolic Blood Pressure     Ventricular Rate 86    Atrial Rate 86    NV Interval 146    QRS Duration 76        QTc 461    P Axis 55    R AXIS 36    T Axis 58    Interpretation ECG      Sinus rhythm  Normal ECG  No previous ECGs available  Confirmed by GENERATED REPORT, COMPUTER ([id number]),  Gage Mancia ([id number]) on 2/4/2023 9:04:19 PM         Imaging:  Chest XR,  PA & LAT   Final Result   IMPRESSION: Heart size and pulmonary vascularity normal. Blunting of the costophrenic angles on the lateral view suggesting trace fluid or thickened pleura. Lungs otherwise clear.         Report per radiology    Laboratory:  Labs Ordered and Resulted from Time of ED Arrival to Time of ED Departure   BASIC METABOLIC PANEL - Abnormal       Result Value    Sodium 135 (*)     Potassium 3.8      Chloride 97 (*)     Carbon Dioxide (CO2) 26      Anion Gap 12      Urea Nitrogen 13.9      Creatinine 1.14      Calcium 9.2      Glucose 92      GFR Estimate 83     INFLUENZA A/B & SARS-COV2 PCR MULTIPLEX - Normal    Influenza A PCR Negative      Influenza B PCR Negative      RSV PCR  Negative      SARS CoV2 PCR Negative     TROPONIN T, HIGH SENSITIVITY - Normal    Troponin T, High Sensitivity <6     D DIMER QUANTITATIVE - Normal    D-Dimer Quantitative <0.27     CBC WITH PLATELETS AND DIFFERENTIAL    WBC Count 7.5      RBC Count 5.56      Hemoglobin 16.3      Hematocrit 48.6      MCV 87      MCH 29.3      MCHC 33.5      RDW 12.5      Platelet Count 282      % Neutrophils 45      % Lymphocytes 44      % Monocytes 8      % Eosinophils 3      % Basophils 0      % Immature Granulocytes 0      NRBCs per 100 WBC 0      Absolute Neutrophils 3.4      Absolute Lymphocytes 3.3      Absolute Monocytes 0.6      Absolute Eosinophils 0.2      Absolute Basophils 0.0      Absolute Immature Granulocytes 0.0      Absolute NRBCs 0.0          Emergency Department Course & Assessments:  Interventions:  Medications   albuterol (PROVENTIL) neb solution 5 mg (5 mg Nebulization Given 2/4/23 2044)      Independent Interpretation (X-rays, CTs, rhythm strip):  none     Social Determinants of Health affecting care:   ethnicity    Assessments:  2020 I obtained the history and examined the patient as noted above.     Disposition:  The patient was discharged to home.     Impression & Plan      Medical Decision Making:  Isabel Argueta is a 42 year old male presents to the ER for evaluation of a cough that has been persistent over the past couple weeks with associated shortness of breath and chest pain.  See HPI.  He is mildly tachycardic.  He has a reassuring exam and is in no acute distress.  His EKG is reassuring.  CBC, BMP, troponin, D-dimer, COVID test, chest radiograph unremarkable.  He says that he improved with some albuterol and prednisone that his family practice provider gave him.  He says that he has suffered from pneumonia in the past that took multiple rounds of antibiotics to cure it.  He is interested in antibiotics today given his subjective crackling sounds when breathing so we will prescribe doxycycline and  Augmentin.  He is to follow-up with primary care provider and return with new or worsening symptoms.  He is comfortable with the plan and has no further questions.    Diagnosis:    ICD-10-CM    1. Cough, unspecified type  R05.9       2. SOB (shortness of breath)  R06.02            Discharge Medications:  Discharge Medication List as of 2/4/2023  9:27 PM      START taking these medications    Details   amoxicillin-clavulanate (AUGMENTIN) 875-125 MG tablet Take 1 tablet by mouth 2 times daily for 5 days, Disp-10 tablet, R-0, E-Prescribe      doxycycline hyclate (VIBRAMYCIN) 100 MG capsule Take 1 capsule (100 mg) by mouth 2 times daily for 5 days, Disp-10 capsule, R-0, E-Prescribe                Scribe Disclosure:  I, Joshua Kjer, am serving as a scribe at 8:28 PM on 2/4/2023 to document services personally performed by Fransico Gordon PA-C based on my observations and the provider's statements to me.   2/4/2023   Fransico Gordon PA-C Cyr, Matthew R, PA-C  02/04/23 2146

## 2023-02-06 ENCOUNTER — PRE VISIT (OUTPATIENT)
Dept: ORTHOPEDICS | Facility: CLINIC | Age: 43
End: 2023-02-06

## 2023-02-08 DIAGNOSIS — R06.2 WHEEZING: ICD-10-CM

## 2023-02-08 PROCEDURE — 94060 EVALUATION OF WHEEZING: CPT | Performed by: INTERNAL MEDICINE

## 2023-02-08 PROCEDURE — 94729 DIFFUSING CAPACITY: CPT | Performed by: INTERNAL MEDICINE

## 2023-02-08 PROCEDURE — 94726 PLETHYSMOGRAPHY LUNG VOLUMES: CPT | Performed by: INTERNAL MEDICINE

## 2023-02-09 NOTE — TELEPHONE ENCOUNTER
DIAGNOSIS: Acute pain of left knee [M25.562] / Veto Valentin DO in  PRIMARY CARE   APPOINTMENT DATE: 2/13/23   NOTES STATUS DETAILS   OFFICE NOTE from referring provider Internal 1/19/23 OV Veto Valentin DO   MEDICATION LIST Internal    LABS     CBC/DIFF Internal 2/4/23   MRI Internal 1/23/23 MR LEFT KNEE

## 2023-02-12 LAB
DLCOCOR-%PRED-PRE: 113 %
DLCOCOR-PRE: 36.86 ML/MIN/MMHG
DLCOUNC-%PRED-PRE: 119 %
DLCOUNC-PRE: 38.51 ML/MIN/MMHG
DLCOUNC-PRED: 32.34 ML/MIN/MMHG
ERV-%PRED-PRE: 96 %
ERV-PRE: 1.3 L
ERV-PRED: 1.35 L
EXPTIME-PRE: 6.59 SEC
FEF2575-%PRED-POST: 100 %
FEF2575-%PRED-PRE: 103 %
FEF2575-POST: 4.03 L/SEC
FEF2575-PRE: 4.14 L/SEC
FEF2575-PRED: 3.99 L/SEC
FEFMAX-%PRED-PRE: 121 %
FEFMAX-PRE: 12.78 L/SEC
FEFMAX-PRED: 10.49 L/SEC
FEV1-%PRED-PRE: 103 %
FEV1-PRE: 4.25 L
FEV1FEV6-PRE: 83 %
FEV1FEV6-PRED: 82 %
FEV1FVC-PRE: 82 %
FEV1FVC-PRED: 81 %
FEV1SVC-PRE: 82 %
FEV1SVC-PRED: 72 %
FIFMAX-PRE: 8.33 L/SEC
FRCPLETH-%PRED-PRE: 69 %
FRCPLETH-PRE: 2.44 L
FRCPLETH-PRED: 3.53 L
FVC-%PRED-PRE: 101 %
FVC-PRE: 5.17 L
FVC-PRED: 5.07 L
IC-%PRED-PRE: 88 %
IC-PRE: 3.85 L
IC-PRED: 4.34 L
RVPLETH-%PRED-PRE: 55 %
RVPLETH-PRE: 1.14 L
RVPLETH-PRED: 2.05 L
TLCPLETH-%PRED-PRE: 84 %
TLCPLETH-PRE: 6.29 L
TLCPLETH-PRED: 7.43 L
VA-%PRED-PRE: 92 %
VA-PRE: 6.5 L
VC-%PRED-PRE: 90 %
VC-PRE: 5.15 L
VC-PRED: 5.69 L

## 2023-02-13 ENCOUNTER — DOCUMENTATION ONLY (OUTPATIENT)
Dept: ORTHOPEDICS | Facility: CLINIC | Age: 43
End: 2023-02-13

## 2023-02-13 ENCOUNTER — PRE VISIT (OUTPATIENT)
Dept: ORTHOPEDICS | Facility: CLINIC | Age: 43
End: 2023-02-13

## 2023-02-13 ENCOUNTER — OFFICE VISIT (OUTPATIENT)
Dept: ORTHOPEDICS | Facility: CLINIC | Age: 43
End: 2023-02-13
Payer: COMMERCIAL

## 2023-02-13 VITALS — BODY MASS INDEX: 32.23 KG/M2 | WEIGHT: 238 LBS | HEIGHT: 72 IN

## 2023-02-13 DIAGNOSIS — M25.562 ACUTE PAIN OF LEFT KNEE: ICD-10-CM

## 2023-02-13 PROCEDURE — 99204 OFFICE O/P NEW MOD 45 MIN: CPT | Mod: GC | Performed by: ORTHOPAEDIC SURGERY

## 2023-02-13 NOTE — LETTER
2/13/2023         RE: Isabel Argueta  [address]  [facility] [address]        Dear Colleague,    Thank you for referring your patient, Isabel rAgueta, to the [facility] [place]. Please see a copy of my visit note below.    CHIEF CONCERN: Left knee pain    HISTORY:   Isabel Argueta is a 42-year-old gentleman who presents to clinic today for evaluation of his left knee pain.  Patient reports that he was in his garage on Thanksgiving when he went to change directions, and had a twisting injury to his left knee.  He noted immediate onset of left knee pain predominantly along the medial aspect of his knee.  He denies significant swelling following the event.  Due to his persistent knee pain, patient has been reticent to kneel for his 5 daily prayers, and has been avoiding stairs. He reports that he has been managing his persistent symptoms with activity modification, and utilization of a knee brace.  He reports that the brace has been beneficial in managing his symptoms however, he continues to necessitate activity modification.  Patient acknowledges that his knee pain bothers him on a daily basis.  He is not taking any medications related to pain/discomfort.  He denies associated neurovascular changes.  He denies prior injury to his left knee.  He denies prior intra-articular injection.      He presents to clinic today with his parents to discuss the results of his MRI, and the available treatment options.    PAST MEDICAL HISTORY: (Reviewed with the patient and in the [facility] medical record)  1. Hypertriglyceridemia  2. Fatty liver    PAST SURGICAL HISTORY: (Reviewed with the patient and in the [facility] medical record)  1. Chalazion excision    MEDICATIONS: (Reviewed with the patient and in the [facility] medical record)    Notable medications include: Albuterol    ALLERGIES: (Reviewed with the patient and in the [facility] medical record)  1. Dust mites    SOCIAL HISTORY:  (Reviewed with the patient and in the medical record)  --Tobacco: Denies  --Occupation:  [employer]  --Currently in school at the [employer] to obtain his masters of Divinity  --Avocation/Sport: Enjoys going for walks, and playing video games    FAMILY HISTORY: (Reviewed with the patient and in the medical record)  -- No family history of bleeding, clotting, or difficulty with anesthesia    REVIEW OF SYSTEMS: (Reviewed with the patient and on the health intake form)  -- A comprehensive 10 point review of systems was conducted and is negative except as noted in the HPI    EXAM:     General: Awake, Alert and Oriented, No acute Distress. Articulate and Interactive    Body mass index is 32.41 kg/m .    Left lower extremity :    Skin is Warm and Well perfused, no suggestion of infection    No significant tenderness to palpation associated with the medial joint line    Nontender to palpation about the lateral joint line, popliteal fossa, patellar/quadriceps tendon    Knee ROM = 0- 140 degrees    Stable to varus/valgus stress testing, and anterior/posterior drawer testing    Equivocal Berny's with slightly increased pain to the medial joint line    EHL/FHL/TA/GS 5/5    Sensation intact L3-S1    2+ Dorsalis Pedis Pulse    IMAGING:  MRI of the left knee from 1/23/2023 were independently reviewed by me and findings were discussed with the patient today. 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    ASSESSMENT:  1. Left medial meniscus tear    Discussed with patient the underlying etiology of his left knee pain.  Reviewed the natural history of meniscal tears specifically, that meniscal tears do not heal however, do not necessitate surgical intervention.  Reviewed the available treatment options including comprehensive conservative treatment in the form of activity modification, symptom  management, and potential intra-articular joint injection versus surgical intervention in the form of arthroscopic meniscectomy.  Discussed that symptoms are the predominant guide for treatment therefore, it is important for patient to determine how symptomatic his left knee is, and how he would like to manage his persistent symptoms.  Reviewed the risk, and benefits associated with the available treatment options.  After discussion of the available treatment options, and their associated risk and benefits, patient elected to proceed with a left knee arthroscopic meniscectomy.    PLAN:  1. Schedule left knee arthroscopic meniscectomy    Patient, and his parents acknowledged understanding of the above care plan; no additional questions or concerns at this point in time.    This patient was discussed and evaluated with Dr. Davis who is in agreement with the above care plan.    Martha Chou MD  Orthopaedic Surgery, PGY-5         Patient seen and examined with the resident. I also personally reviewed the images and interpreted the imaging myself.     Assesment: left medial meniscus tear, displaced, acute tear    Plan: I discussed with the patient the risks, benefits, complications and techniques of surgery as well as the natural history of meniscus tears and the alternative treatment options.    The risks include, but are not limited to the risk of death and risk of a myocardial infarction, risk of bleeding and a risk of infection, risk of nerve damage and a risk of muscle damage, stiffness, instability, continued pain or worsening pain and retear, subsequent arthritis, need for future surgery.    The patient was provided an opportunity to ask questions and these were answered.    I agree with history, physical and imaging as well as the assessment and plan as detailed by Dr. Chou.       Again, thank you for allowing me to participate in the care of your patient.      Sincerely,      Oswaldo Parsons  MD Susan

## 2023-02-13 NOTE — PROGRESS NOTES
Procedure: meniscectomy  Facility: [facility]  Length: 60 minutes  Anesthesia: Choice  Post-op appointments needed: 2 weeks provider only, 6 weeks with provider only.  Surgery packet/instructions given to patient?  Yes     Pre-Operative Teaching Flowsheet     Person(s) involved in teaching: Patient, Mother and Father     Motivation Level:  Receptive (willing/able to accept information) and asks appropriate questions where applicable: Yes  Any cultural factors/Latter day beliefs that may influence understanding or compliance? No     Patient and Family demonstrates understanding of the following:  Pre-operative planning, including the necessary appointments and preparation needed prior to surgery: Yes  Which situations necessitate calling provider and whom to contact: Yes  Pain management techniques pre and post op: Yes  How, and when, to access community resources: Yes    Who will stay/ with patient after surgery: mom and dad  Who will drive patient to surgery: Mom and dad  PCP at [facility] and PT within [facility] after.   He will have surgery on 3/15/23         Additional Teaching Concerns Addressed:   Post-operative living arrangements and necessary adaptations to living environment.     Instructional Materials Used/Given: Yes, pre-op packet given including forms for Your surgery day, medications to stop taking before surgery, preparing for surgery, Covid-19 testing, showering before surgery, Stop light tool introduced, Opioid pain medication guideline, pre-op physical form, and map  Patient expressed understanding of all forms given, questions were answered and will review in more detail at home.     Time spent with patient: 20 minutes.

## 2023-02-13 NOTE — PROGRESS NOTES
Patient seen and examined with the resident. I also personally reviewed the images and interpreted the imaging myself.     Assesment: left medial meniscus tear, displaced, acute tear    Plan: I discussed with the patient the risks, benefits, complications and techniques of surgery as well as the natural history of meniscus tears and the alternative treatment options.    The risks include, but are not limited to the risk of death and risk of a myocardial infarction, risk of bleeding and a risk of infection, risk of nerve damage and a risk of muscle damage, stiffness, instability, continued pain or worsening pain and retear, subsequent arthritis, need for future surgery.    The patient was provided an opportunity to ask questions and these were answered.    I agree with history, physical and imaging as well as the assessment and plan as detailed by Dr. Chou.

## 2023-02-13 NOTE — PROGRESS NOTES
CHIEF CONCERN: Left knee pain    HISTORY:   Isabel Argueta is a 42-year-old gentleman who presents to clinic today for evaluation of his left knee pain.  Patient reports that he was in his garage on Thanksgiving when he went to change directions, and had a twisting injury to his left knee.  He noted immediate onset of left knee pain predominantly along the medial aspect of his knee.  He denies significant swelling following the event.  Due to his persistent knee pain, patient has been reticent to kneel for his 5 daily prayers, and has been avoiding stairs. He reports that he has been managing his persistent symptoms with activity modification, and utilization of a knee brace.  He reports that the brace has been beneficial in managing his symptoms however, he continues to necessitate activity modification.  Patient acknowledges that his knee pain bothers him on a daily basis.  He is not taking any medications related to pain/discomfort.  He denies associated neurovascular changes.  He denies prior injury to his left knee.  He denies prior intra-articular injection.      He presents to clinic today with his parents to discuss the results of his MRI, and the available treatment options.    PAST MEDICAL HISTORY: (Reviewed with the patient and in the [facility] medical record)  1. Hypertriglyceridemia  2. Fatty liver    PAST SURGICAL HISTORY: (Reviewed with the patient and in the [facility] medical record)  1. Chalazion excision    MEDICATIONS: (Reviewed with the patient and in the [facility] medical record)    Notable medications include: Albuterol    ALLERGIES: (Reviewed with the patient and in the [facility] medical record)  1. Dust mites    SOCIAL HISTORY: (Reviewed with the patient and in the medical record)  --Tobacco: Denies  --Occupation:  at [employer]  --Currently in school at the [employer] to obtain his masters of Divinity  --Avocation/Sport: Enjoys going for walks, and playing video games    FAMILY HISTORY:  (Reviewed with the patient and in the medical record)  -- No family history of bleeding, clotting, or difficulty with anesthesia    REVIEW OF SYSTEMS: (Reviewed with the patient and on the health intake form)  -- A comprehensive 10 point review of systems was conducted and is negative except as noted in the HPI    EXAM:     General: Awake, Alert and Oriented, No acute Distress. Articulate and Interactive    Body mass index is 32.41 kg/m .    Left lower extremity :    Skin is Warm and Well perfused, no suggestion of infection    No significant tenderness to palpation associated with the medial joint line    Nontender to palpation about the lateral joint line, popliteal fossa, patellar/quadriceps tendon    Knee ROM = 0- 140 degrees    Stable to varus/valgus stress testing, and anterior/posterior drawer testing    Equivocal Berny's with slightly increased pain to the medial joint line    EHL/FHL/TA/GS 5/5    Sensation intact L3-S1    2+ Dorsalis Pedis Pulse    IMAGING:  MRI of the left knee from 1/23/2023 were independently reviewed by me and findings were discussed with the patient today. 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    ASSESSMENT:  1. Left medial meniscus tear    Discussed with patient the underlying etiology of his left knee pain.  Reviewed the natural history of meniscal tears specifically, that meniscal tears do not heal however, do not necessitate surgical intervention.  Reviewed the available treatment options including comprehensive conservative treatment in the form of activity modification, symptom management, and potential intra-articular joint injection versus surgical intervention in the form of arthroscopic meniscectomy.  Discussed that symptoms are the predominant guide for treatment therefore, it is important for patient to determine how symptomatic his left knee is,  and how he would like to manage his persistent symptoms.  Reviewed the risk, and benefits associated with the available treatment options.  After discussion of the available treatment options, and their associated risk and benefits, patient elected to proceed with a left knee arthroscopic meniscectomy.    PLAN:  1. Schedule left knee arthroscopic meniscectomy    Patient, and his parents acknowledged understanding of the above care plan; no additional questions or concerns at this point in time.    This patient was discussed and evaluated with Dr. Davis who is in agreement with the above care plan.    Martha Chou MD  Orthopaedic Surgery, PGY-5

## 2023-02-15 ENCOUNTER — TELEPHONE (OUTPATIENT)
Dept: FAMILY MEDICINE | Facility: CLINIC | Age: 43
End: 2023-02-15
Payer: COMMERCIAL

## 2023-02-22 ENCOUNTER — OFFICE VISIT (OUTPATIENT)
Dept: FAMILY MEDICINE | Facility: CLINIC | Age: 43
End: 2023-02-22
Payer: COMMERCIAL

## 2023-02-22 VITALS
BODY MASS INDEX: 32.1 KG/M2 | HEIGHT: 72 IN | TEMPERATURE: 97 F | WEIGHT: 237 LBS | OXYGEN SATURATION: 96 % | HEART RATE: 76 BPM | RESPIRATION RATE: 16 BRPM | SYSTOLIC BLOOD PRESSURE: 118 MMHG | DIASTOLIC BLOOD PRESSURE: 80 MMHG

## 2023-02-22 DIAGNOSIS — Z01.818 PREOP EXAMINATION: Primary | ICD-10-CM

## 2023-02-22 DIAGNOSIS — R06.2 WHEEZING: ICD-10-CM

## 2023-02-22 DIAGNOSIS — K76.0 FATTY LIVER: ICD-10-CM

## 2023-02-22 DIAGNOSIS — M25.562 ACUTE PAIN OF LEFT KNEE: ICD-10-CM

## 2023-02-22 DIAGNOSIS — J30.9 ALLERGIC RHINITIS, UNSPECIFIED SEASONALITY, UNSPECIFIED TRIGGER: ICD-10-CM

## 2023-02-22 DIAGNOSIS — H65.493 CHRONIC OTITIS MEDIA OF BOTH EARS WITH EFFUSION: ICD-10-CM

## 2023-02-22 LAB
ALBUMIN SERPL BCG-MCNC: 4.6 G/DL (ref 3.5–5.2)
ALP SERPL-CCNC: 71 U/L (ref 40–129)
ALT SERPL W P-5'-P-CCNC: 128 U/L (ref 10–50)
ANION GAP SERPL CALCULATED.3IONS-SCNC: 16 MMOL/L (ref 7–15)
AST SERPL W P-5'-P-CCNC: 49 U/L (ref 10–50)
BILIRUB SERPL-MCNC: 0.4 MG/DL
BUN SERPL-MCNC: 13.1 MG/DL (ref 6–20)
CALCIUM SERPL-MCNC: 9.9 MG/DL (ref 8.6–10)
CHLORIDE SERPL-SCNC: 104 MMOL/L (ref 98–107)
CREAT SERPL-MCNC: 0.95 MG/DL (ref 0.67–1.17)
DEPRECATED HCO3 PLAS-SCNC: 21 MMOL/L (ref 22–29)
GFR SERPL CREATININE-BSD FRML MDRD: >90 ML/MIN/1.73M2
GLUCOSE SERPL-MCNC: 97 MG/DL (ref 70–99)
POTASSIUM SERPL-SCNC: 4 MMOL/L (ref 3.4–5.3)
PROT SERPL-MCNC: 7.5 G/DL (ref 6.4–8.3)
SODIUM SERPL-SCNC: 141 MMOL/L (ref 136–145)

## 2023-02-22 PROCEDURE — 99214 OFFICE O/P EST MOD 30 MIN: CPT | Performed by: FAMILY MEDICINE

## 2023-02-22 PROCEDURE — 80053 COMPREHEN METABOLIC PANEL: CPT | Performed by: FAMILY MEDICINE

## 2023-02-22 PROCEDURE — 36415 COLL VENOUS BLD VENIPUNCTURE: CPT | Performed by: FAMILY MEDICINE

## 2023-02-22 RX ORDER — FLUTICASONE PROPIONATE 50 MCG
1 SPRAY, SUSPENSION (ML) NASAL DAILY
Qty: 16 G | Refills: 11 | Status: SHIPPED | OUTPATIENT
Start: 2023-02-22

## 2023-02-22 RX ORDER — MULTIVITAMIN WITH IRON
1 TABLET ORAL DAILY
COMMUNITY

## 2023-02-22 ASSESSMENT — PAIN SCALES - GENERAL: PAINLEVEL: MILD PAIN (2)

## 2023-02-22 NOTE — PROGRESS NOTES
Assessment and Plan:    1) Preoperative assessment.  Cardiac risk for the planned procedure is: low  Diagnostic studies ordered: labs as below  Medically optimized for proposed surgery: yes  Perioperative med changes: discontinue fish oil and vitamins for 10 days prior to surgery    2) Meniscal tear, left. Discussed option of surgical repair vs PT. He elects to proceed with surgery.    3) Wheezing. Resolved following antibiotics. Isabel feels strongly that there was an upper respiratory source. His spirometry was reassuring. He was unable to complete methacholine challenge. Unclear if he has asthma. For now, will observe.    4) Middle ear effusion, b/l, chronic. Denies significant hearing concerns. Suspect this is allergic. He has completed antibiotic course. Pt has been using Flonase intermittently. Will have him use this continuously and then reassess in 1-2 months. He will continue nasal saline as well.    Orders Placed This Encounter   Procedures     Comprehensive metabolic panel (BMP + Alb, Alk Phos, ALT, AST, Total. Bili, TP)     ----  [address]  [address] [place] [id number]  Phone: [phone]  Fax: [phone]  Primary Provider: Bess Gongora  Pre-op Performing Provider: MAURO MURPHY    PREOPERATIVE EVALUATION:  Today's date: 2/22/2023    Isabel Argueta is a 42 year old male who presents for a preoperative evaluation.    Surgical Information:  Surgery/Procedure: Examination under anesthesia, knee arthroscopy, meniscectomy  Surgery Location: [facility] [address] [place] [address]  Surgeon: Oswaldo aDvis MD  Surgery Date: 03/15/2023  Time of Surgery: 7:15am  Where patient plans to recover: At home with family  Fax number for surgical facility: Note does not need to be faxed, will be available electronically in Epic.    Type of Anesthesia Anticipated: Choice    Subjective     HPI related to upcoming procedure:     Left knee twisting injury  "around Thanksgiving 2022. MRI showed meniscal tear. Persistent pain and catching sensation.    Of note, reported wheezing earlier this month. Had chest XR and spirometry. Went to ED and was treated with antibiotics. Symptoms are \"97%\" improved. Feels that wheezing sound was coming from his nose and ears. Wonders if mold in his water bottle was contributing.    Preop Questions 2/22/2023   1. Have you ever had a heart attack or stroke? No   2. Have you ever had surgery on your heart or blood vessels, such as a stent placement, a coronary artery bypass, or surgery on an artery in your head, neck, heart, or legs? No   3. Do you have chest pain with activity? No   4. Do you have a history of  heart failure? No   5. Do you currently have a cold, bronchitis or symptoms of other infection? No   6. Do you have a cough, shortness of breath, or wheezing? No   7. Do you or anyone in your family have previous history of blood clots? No   8. Do you or does anyone in your family have a serious bleeding problem such as prolonged bleeding following surgeries or cuts? No   9. Have you ever had problems with anemia or been told to take iron pills? No   10. Have you had any abnormal blood loss such as black, tarry or bloody stools? No   11. Have you ever had a blood transfusion? No   12. Are you willing to have a blood transfusion if it is medically needed before, during, or after your surgery? Yes   13. Have you or any of your relatives ever had problems with anesthesia? No   14. Do you have sleep apnea, excessive snoring or daytime drowsiness? No   15. Do you have any artifical heart valves or other implanted medical devices like a pacemaker, defibrillator, or continuous glucose monitor? No   16. Do you have artificial joints? No   17. Are you allergic to latex? No       Patient Active Problem List    Diagnosis Date Noted     Chronic otitis media of both ears with effusion 02/23/2023     Priority: Medium     Acute pain of left knee " 02/13/2023     Priority: Medium     Added automatically from request for surgery [id number]       Wheezing 01/28/2023     Priority: Medium     PFTs 2/2023 reassuring, though methacholine challenge not performed due to syncope w/ baseline spirometry.       Fatty liver 01/28/2023     Priority: Medium     Hypertriglyceridemia 03/26/2021     Priority: Medium     Allergic rhinitis 11/29/2005     Priority: Medium      Past Medical History:   Diagnosis Date     Palpitations      Past Surgical History:   Procedure Laterality Date     CHALAZION EXCISION Left 2003     EYE SURGERY      lid, age 6     HC MASTECTOMY FOR GYNECOMASTIA  2018     LIVER BIOPSY      [facility]     WISDOM TOOTH EXTRACTION       Current Outpatient Medications   Medication Sig Dispense Refill     fluticasone (FLONASE) 50 MCG/ACT nasal spray Spray 1 spray into both nostrils daily 16 g 11     magnesium 250 MG tablet Take 1 tablet by mouth daily       Multiple Vitamin (MULTIVITAMIN ADULT PO)        Omega-3 Fatty Acids (FISH OIL PO)        omeprazole (PRILOSEC OTC) 20 MG EC tablet Take 1 tablet (20 mg) by mouth daily 30 tablet 5     RESTASIS 0.05 % ophthalmic emulsion        albuterol (PROAIR HFA/PROVENTIL HFA/VENTOLIN HFA) 108 (90 Base) MCG/ACT inhaler Inhale 2 puffs into the lungs every 4 hours as needed for shortness of breath, wheezing or cough 18 g 11     spacer (OPTICHAMBER [place]) holding chamber Use with albuterol inhaler as prescribed. 1 each 11       Allergies   Allergen Reactions     Dust Mites         Social History     Tobacco Use     Smoking status: Never     Smokeless tobacco: Never   Substance Use Topics     Alcohol use: Never        Objective     /80   Pulse 76   Temp 97  F (36.1  C) (Temporal)   Resp 16   Ht 1.829 m (6')   Wt 107.5 kg (237 lb)   SpO2 96%   BMI 32.14 kg/m      Physical Exam   TMs with effusion b/l  Inflamed nasal passages b/l  Oropharynx without abnormal masses  Heart with RRR, no murmurs  Lung fields  CTAB  Abdomen soft, nontender, no palpable masses  No lower leg edema    Diagnostics: See Epic

## 2023-02-23 PROBLEM — H65.493 CHRONIC OTITIS MEDIA OF BOTH EARS WITH EFFUSION: Status: ACTIVE | Noted: 2023-02-23

## 2023-02-24 DIAGNOSIS — M25.562 ACUTE PAIN OF LEFT KNEE: Primary | ICD-10-CM

## 2023-03-01 ENCOUNTER — MYC MEDICAL ADVICE (OUTPATIENT)
Dept: FAMILY MEDICINE | Facility: CLINIC | Age: 43
End: 2023-03-01
Payer: COMMERCIAL

## 2023-03-01 DIAGNOSIS — R09.81 CONGESTION OF PARANASAL SINUS: Primary | ICD-10-CM

## 2023-03-02 NOTE — TELEPHONE ENCOUNTER
Please see pt's My chart message.    Order has be Td up if you would like to sign. Thanks    Katy Pablo RN  [facility]

## 2023-03-09 NOTE — TELEPHONE ENCOUNTER
FUTURE VISIT INFORMATION      FUTURE VISIT INFORMATION:    Date: 6/21/23    Time: 8:30    Location: [facility]  REFERRAL INFORMATION:    Referring provider:  Bess Gongora APRN CNP    Referring providers clinic:  [facility]    Reason for visit/diagnosis  appt per pt/ref, Congestion of paranasal sinus [R09.81], ref prov: Bess Gongora APRN CNP, recs/ref in epic, confirmed [facility] location    RECORDS REQUESTED FROM:       Clinic name Comments Records Status Imaging Status   [facility] 2/22/23- note with Barry Garay MD   7/21/22 - note with Bess Gongora APRN CNP epic    IMAGING  2/4/23- XR CHEST  [facility]  PACS

## 2023-03-14 ENCOUNTER — ANESTHESIA EVENT (OUTPATIENT)
Dept: SURGERY | Facility: AMBULATORY SURGERY CENTER | Age: 43
End: 2023-03-14
Payer: COMMERCIAL

## 2023-03-15 ENCOUNTER — ANESTHESIA (OUTPATIENT)
Dept: SURGERY | Facility: AMBULATORY SURGERY CENTER | Age: 43
End: 2023-03-15
Payer: COMMERCIAL

## 2023-03-15 ENCOUNTER — HOSPITAL ENCOUNTER (OUTPATIENT)
Facility: AMBULATORY SURGERY CENTER | Age: 43
Discharge: HOME | End: 2023-03-15
Attending: ORTHOPAEDIC SURGERY
Payer: COMMERCIAL

## 2023-03-15 VITALS
DIASTOLIC BLOOD PRESSURE: 87 MMHG | SYSTOLIC BLOOD PRESSURE: 127 MMHG | OXYGEN SATURATION: 96 % | BODY MASS INDEX: 31.15 KG/M2 | RESPIRATION RATE: 16 BRPM | WEIGHT: 230 LBS | TEMPERATURE: 97.1 F | HEIGHT: 72 IN | HEART RATE: 71 BPM

## 2023-03-15 DIAGNOSIS — M25.562 ACUTE PAIN OF LEFT KNEE: ICD-10-CM

## 2023-03-15 DIAGNOSIS — M25.562 ACUTE PAIN OF LEFT KNEE: Primary | ICD-10-CM

## 2023-03-15 PROCEDURE — 29881 ARTHRS KNE SRG MNISECTMY M/L: CPT | Mod: LT | Performed by: ORTHOPAEDIC SURGERY

## 2023-03-15 PROCEDURE — 29881 ARTHRS KNE SRG MNISECTMY M/L: CPT | Mod: LT

## 2023-03-15 RX ORDER — ALBUTEROL SULFATE 0.83 MG/ML
2.5 SOLUTION RESPIRATORY (INHALATION) EVERY 4 HOURS PRN
Status: DISCONTINUED | OUTPATIENT
Start: 2023-03-15 | End: 2023-03-15 | Stop reason: HOSPADM

## 2023-03-15 RX ORDER — KETOROLAC TROMETHAMINE 30 MG/ML
INJECTION, SOLUTION INTRAMUSCULAR; INTRAVENOUS PRN
Status: DISCONTINUED | OUTPATIENT
Start: 2023-03-15 | End: 2023-03-15

## 2023-03-15 RX ORDER — HALOPERIDOL 5 MG/ML
1 INJECTION INTRAMUSCULAR
Status: DISCONTINUED | OUTPATIENT
Start: 2023-03-15 | End: 2023-03-15 | Stop reason: HOSPADM

## 2023-03-15 RX ORDER — ONDANSETRON 4 MG/1
4 TABLET, ORALLY DISINTEGRATING ORAL
Status: DISCONTINUED | OUTPATIENT
Start: 2023-03-15 | End: 2023-03-16 | Stop reason: HOSPADM

## 2023-03-15 RX ORDER — GABAPENTIN 300 MG/1
300 CAPSULE ORAL
Status: COMPLETED | OUTPATIENT
Start: 2023-03-15 | End: 2023-03-15

## 2023-03-15 RX ORDER — DEXAMETHASONE SODIUM PHOSPHATE 4 MG/ML
INJECTION, SOLUTION INTRA-ARTICULAR; INTRALESIONAL; INTRAMUSCULAR; INTRAVENOUS; SOFT TISSUE PRN
Status: DISCONTINUED | OUTPATIENT
Start: 2023-03-15 | End: 2023-03-15

## 2023-03-15 RX ORDER — SODIUM CHLORIDE, SODIUM LACTATE, POTASSIUM CHLORIDE, CALCIUM CHLORIDE 600; 310; 30; 20 MG/100ML; MG/100ML; MG/100ML; MG/100ML
INJECTION, SOLUTION INTRAVENOUS CONTINUOUS PRN
Status: DISCONTINUED | OUTPATIENT
Start: 2023-03-15 | End: 2023-03-15

## 2023-03-15 RX ORDER — ONDANSETRON 4 MG/1
4 TABLET, ORALLY DISINTEGRATING ORAL EVERY 30 MIN PRN
Status: DISCONTINUED | OUTPATIENT
Start: 2023-03-15 | End: 2023-03-15 | Stop reason: HOSPADM

## 2023-03-15 RX ORDER — LABETALOL HYDROCHLORIDE 5 MG/ML
10 INJECTION, SOLUTION INTRAVENOUS
Status: DISCONTINUED | OUTPATIENT
Start: 2023-03-15 | End: 2023-03-15 | Stop reason: HOSPADM

## 2023-03-15 RX ORDER — DEXAMETHASONE SODIUM PHOSPHATE 10 MG/ML
4 INJECTION, SOLUTION INTRAMUSCULAR; INTRAVENOUS
Status: DISCONTINUED | OUTPATIENT
Start: 2023-03-15 | End: 2023-03-15 | Stop reason: HOSPADM

## 2023-03-15 RX ORDER — HYDROMORPHONE HYDROCHLORIDE 1 MG/ML
0.2 INJECTION, SOLUTION INTRAMUSCULAR; INTRAVENOUS; SUBCUTANEOUS EVERY 5 MIN PRN
Status: DISCONTINUED | OUTPATIENT
Start: 2023-03-15 | End: 2023-03-15 | Stop reason: HOSPADM

## 2023-03-15 RX ORDER — PROPOFOL 10 MG/ML
INJECTION, EMULSION INTRAVENOUS CONTINUOUS PRN
Status: DISCONTINUED | OUTPATIENT
Start: 2023-03-15 | End: 2023-03-15

## 2023-03-15 RX ORDER — HYDROXYZINE PAMOATE 25 MG/1
50 CAPSULE ORAL 4 TIMES DAILY PRN
Qty: 30 CAPSULE | Refills: 0 | Status: SHIPPED | OUTPATIENT
Start: 2023-03-15 | End: 2023-06-15

## 2023-03-15 RX ORDER — SODIUM CHLORIDE, SODIUM LACTATE, POTASSIUM CHLORIDE, CALCIUM CHLORIDE 600; 310; 30; 20 MG/100ML; MG/100ML; MG/100ML; MG/100ML
INJECTION, SOLUTION INTRAVENOUS CONTINUOUS
Status: DISCONTINUED | OUTPATIENT
Start: 2023-03-15 | End: 2023-03-15 | Stop reason: HOSPADM

## 2023-03-15 RX ORDER — KETOROLAC TROMETHAMINE 30 MG/ML
15 INJECTION, SOLUTION INTRAMUSCULAR; INTRAVENOUS
Status: DISCONTINUED | OUTPATIENT
Start: 2023-03-15 | End: 2023-03-15 | Stop reason: HOSPADM

## 2023-03-15 RX ORDER — HYDROMORPHONE HYDROCHLORIDE 1 MG/ML
0.4 INJECTION, SOLUTION INTRAMUSCULAR; INTRAVENOUS; SUBCUTANEOUS EVERY 5 MIN PRN
Status: DISCONTINUED | OUTPATIENT
Start: 2023-03-15 | End: 2023-03-15 | Stop reason: HOSPADM

## 2023-03-15 RX ORDER — CEFAZOLIN SODIUM 2 G/50ML
2 SOLUTION INTRAVENOUS
Status: COMPLETED | OUTPATIENT
Start: 2023-03-15 | End: 2023-03-15

## 2023-03-15 RX ORDER — BUPIVACAINE HYDROCHLORIDE AND EPINEPHRINE 2.5; 5 MG/ML; UG/ML
INJECTION, SOLUTION INFILTRATION; PERINEURAL PRN
Status: DISCONTINUED | OUTPATIENT
Start: 2023-03-15 | End: 2023-03-15 | Stop reason: HOSPADM

## 2023-03-15 RX ORDER — ACETAMINOPHEN 325 MG/1
975 TABLET ORAL ONCE
Status: COMPLETED | OUTPATIENT
Start: 2023-03-15 | End: 2023-03-15

## 2023-03-15 RX ORDER — LIDOCAINE HYDROCHLORIDE 20 MG/ML
INJECTION, SOLUTION INFILTRATION; PERINEURAL PRN
Status: DISCONTINUED | OUTPATIENT
Start: 2023-03-15 | End: 2023-03-15

## 2023-03-15 RX ORDER — OXYCODONE HYDROCHLORIDE 5 MG/1
5-10 TABLET ORAL EVERY 4 HOURS PRN
Qty: 10 TABLET | Refills: 0 | Status: SHIPPED | OUTPATIENT
Start: 2023-03-15 | End: 2023-06-15

## 2023-03-15 RX ORDER — ONDANSETRON 2 MG/ML
4 INJECTION INTRAMUSCULAR; INTRAVENOUS EVERY 30 MIN PRN
Status: DISCONTINUED | OUTPATIENT
Start: 2023-03-15 | End: 2023-03-15 | Stop reason: HOSPADM

## 2023-03-15 RX ORDER — OXYCODONE HYDROCHLORIDE 5 MG/1
5 TABLET ORAL
Status: DISCONTINUED | OUTPATIENT
Start: 2023-03-15 | End: 2023-03-16 | Stop reason: HOSPADM

## 2023-03-15 RX ORDER — OXYCODONE HYDROCHLORIDE 5 MG/1
5 TABLET ORAL
Status: COMPLETED | OUTPATIENT
Start: 2023-03-15 | End: 2023-03-15

## 2023-03-15 RX ORDER — FENTANYL CITRATE 50 UG/ML
25 INJECTION, SOLUTION INTRAMUSCULAR; INTRAVENOUS
Status: DISCONTINUED | OUTPATIENT
Start: 2023-03-15 | End: 2023-03-16 | Stop reason: HOSPADM

## 2023-03-15 RX ORDER — FENTANYL CITRATE 50 UG/ML
25 INJECTION, SOLUTION INTRAMUSCULAR; INTRAVENOUS EVERY 5 MIN PRN
Status: DISCONTINUED | OUTPATIENT
Start: 2023-03-15 | End: 2023-03-15 | Stop reason: HOSPADM

## 2023-03-15 RX ORDER — ONDANSETRON 4 MG/1
4 TABLET, ORALLY DISINTEGRATING ORAL EVERY 8 HOURS PRN
Qty: 4 TABLET | Refills: 0 | Status: SHIPPED | OUTPATIENT
Start: 2023-03-15 | End: 2023-06-15

## 2023-03-15 RX ORDER — PROPOFOL 10 MG/ML
INJECTION, EMULSION INTRAVENOUS PRN
Status: DISCONTINUED | OUTPATIENT
Start: 2023-03-15 | End: 2023-03-15

## 2023-03-15 RX ORDER — HYDROXYZINE HYDROCHLORIDE 25 MG/1
25 TABLET, FILM COATED ORAL EVERY 6 HOURS PRN
Status: DISCONTINUED | OUTPATIENT
Start: 2023-03-15 | End: 2023-03-15 | Stop reason: HOSPADM

## 2023-03-15 RX ORDER — CEFAZOLIN SODIUM 2 G/50ML
2 SOLUTION INTRAVENOUS SEE ADMIN INSTRUCTIONS
Status: DISCONTINUED | OUTPATIENT
Start: 2023-03-15 | End: 2023-03-15 | Stop reason: HOSPADM

## 2023-03-15 RX ORDER — AMOXICILLIN 250 MG
1-2 CAPSULE ORAL 2 TIMES DAILY
Qty: 30 TABLET | Refills: 0 | Status: SHIPPED | OUTPATIENT
Start: 2023-03-15 | End: 2023-06-15

## 2023-03-15 RX ORDER — ACETAMINOPHEN 325 MG/1
975 TABLET ORAL ONCE
Status: DISCONTINUED | OUTPATIENT
Start: 2023-03-15 | End: 2023-03-15 | Stop reason: HOSPADM

## 2023-03-15 RX ORDER — MEPERIDINE HYDROCHLORIDE 25 MG/ML
12.5 INJECTION INTRAMUSCULAR; INTRAVENOUS; SUBCUTANEOUS EVERY 5 MIN PRN
Status: DISCONTINUED | OUTPATIENT
Start: 2023-03-15 | End: 2023-03-15 | Stop reason: HOSPADM

## 2023-03-15 RX ORDER — ONDANSETRON 2 MG/ML
INJECTION INTRAMUSCULAR; INTRAVENOUS PRN
Status: DISCONTINUED | OUTPATIENT
Start: 2023-03-15 | End: 2023-03-15

## 2023-03-15 RX ORDER — FENTANYL CITRATE 50 UG/ML
50 INJECTION, SOLUTION INTRAMUSCULAR; INTRAVENOUS EVERY 5 MIN PRN
Status: DISCONTINUED | OUTPATIENT
Start: 2023-03-15 | End: 2023-03-15 | Stop reason: HOSPADM

## 2023-03-15 RX ORDER — OXYCODONE HYDROCHLORIDE 5 MG/1
10 TABLET ORAL
Status: DISCONTINUED | OUTPATIENT
Start: 2023-03-15 | End: 2023-03-16 | Stop reason: HOSPADM

## 2023-03-15 RX ORDER — ACETAMINOPHEN 325 MG/1
650 TABLET ORAL
Status: DISCONTINUED | OUTPATIENT
Start: 2023-03-15 | End: 2023-03-16 | Stop reason: HOSPADM

## 2023-03-15 RX ORDER — DIAZEPAM 10 MG/2ML
2.5 INJECTION, SOLUTION INTRAMUSCULAR; INTRAVENOUS
Status: DISCONTINUED | OUTPATIENT
Start: 2023-03-15 | End: 2023-03-15 | Stop reason: HOSPADM

## 2023-03-15 RX ORDER — HYDROXYZINE HYDROCHLORIDE 25 MG/1
25 TABLET, FILM COATED ORAL
Status: DISCONTINUED | OUTPATIENT
Start: 2023-03-15 | End: 2023-03-16 | Stop reason: HOSPADM

## 2023-03-15 RX ORDER — LIDOCAINE 40 MG/G
CREAM TOPICAL
Status: DISCONTINUED | OUTPATIENT
Start: 2023-03-15 | End: 2023-03-15 | Stop reason: HOSPADM

## 2023-03-15 RX ORDER — FENTANYL CITRATE 50 UG/ML
INJECTION, SOLUTION INTRAMUSCULAR; INTRAVENOUS PRN
Status: DISCONTINUED | OUTPATIENT
Start: 2023-03-15 | End: 2023-03-15

## 2023-03-15 RX ORDER — ACETAMINOPHEN 325 MG/1
650 TABLET ORAL EVERY 4 HOURS PRN
Qty: 50 TABLET | Refills: 0 | Status: SHIPPED | OUTPATIENT
Start: 2023-03-15 | End: 2023-06-15

## 2023-03-15 RX ADMIN — PROPOFOL 200 MG: 10 INJECTION, EMULSION INTRAVENOUS at 07:25

## 2023-03-15 RX ADMIN — CEFAZOLIN SODIUM 2 G: 2 SOLUTION INTRAVENOUS at 07:12

## 2023-03-15 RX ADMIN — GABAPENTIN 300 MG: 300 CAPSULE ORAL at 06:36

## 2023-03-15 RX ADMIN — LIDOCAINE HYDROCHLORIDE 60 MG: 20 INJECTION, SOLUTION INFILTRATION; PERINEURAL at 07:20

## 2023-03-15 RX ADMIN — SODIUM CHLORIDE, SODIUM LACTATE, POTASSIUM CHLORIDE, CALCIUM CHLORIDE: 600; 310; 30; 20 INJECTION, SOLUTION INTRAVENOUS at 07:12

## 2023-03-15 RX ADMIN — Medication 0.5 MG: at 07:57

## 2023-03-15 RX ADMIN — DEXAMETHASONE SODIUM PHOSPHATE 4 MG: 4 INJECTION, SOLUTION INTRA-ARTICULAR; INTRALESIONAL; INTRAMUSCULAR; INTRAVENOUS; SOFT TISSUE at 07:33

## 2023-03-15 RX ADMIN — KETOROLAC TROMETHAMINE 30 MG: 30 INJECTION, SOLUTION INTRAMUSCULAR; INTRAVENOUS at 07:58

## 2023-03-15 RX ADMIN — LIDOCAINE HYDROCHLORIDE 40 MG: 20 INJECTION, SOLUTION INFILTRATION; PERINEURAL at 07:25

## 2023-03-15 RX ADMIN — OXYCODONE HYDROCHLORIDE 5 MG: 5 TABLET ORAL at 09:08

## 2023-03-15 RX ADMIN — PROPOFOL 150 MCG/KG/MIN: 10 INJECTION, EMULSION INTRAVENOUS at 07:22

## 2023-03-15 RX ADMIN — ACETAMINOPHEN 975 MG: 325 TABLET ORAL at 06:36

## 2023-03-15 RX ADMIN — ONDANSETRON 4 MG: 2 INJECTION INTRAMUSCULAR; INTRAVENOUS at 07:33

## 2023-03-15 RX ADMIN — FENTANYL CITRATE 50 MCG: 50 INJECTION, SOLUTION INTRAMUSCULAR; INTRAVENOUS at 07:34

## 2023-03-15 NOTE — OP NOTE
PREOPERATIVE DIAGNOSIS: Left knee medial meniscus tear.   POSTOPERATIVE DIAGNOSIS: Left knee medial meniscus tear.   PROCEDURES:   1. Examination under anesthesia, left knee.   2. Left knee arthroscopy, partial medial meniscectomy.   SURGEON: Oswaldo Davis MD   ASSISTANT: MD Shellie  OPERATIVE INDICATIONS: Isabel is a very pleasant 42 year old male who presented to my clinic with medial joint line pain. An MRI had been obtained by his primary care physician demonstrating a displaced medial meniscus tear. I reviewed with him his history, physical and imaging exam. I felt that he would be a candidate for knee arthroscopy, partial medial meniscectomy. He was apprised of the risks and benefits of surgery and desired to proceed despite the risks.   OPERATIVE DETAILS: In the preoperative area, the patient's informed consent was reviewed and he desired to proceed. Left knee was marked. The patient was in agreement. he was taken to the operating room, timeout was performed and all parties were in agreement. Preoperative antibiotics was given within 1 hour of time of surgery. He was placed supine on the operating room table, surrendered to LMA anesthesia and examination under anesthesia was performed with the following findings: Full passive range of motion 0 to 135 degrees. No patellar instability. Stable to varus and valgus stress at 0 and 30 degrees. Stable anterior, posterior drawer. No pivot shift. Negative dial test. Posterior drawer 0. Lachman 0. At this time, a bump was placed underneath the ipsilateral hip. Egg crate was placed beneath the well leg. No tourniquet was placed. A side post was utilized. Left leg was prepped and draped in the usual sterile fashion. Standard anteromedial and anterolateral arthroscopic portals were created and diagnostic arthroscopy was performed with the following findings: Medial patellar facet was Grade 1, lateral patellar facet was Grade 1, central patellar ridge was Grade 1,  central trochlea was normal, medial femoral condyle was Grade 1, medial tibial plateau was Grade 1, lateral femoral condyle was normal, lateral tibial plateau was normal. Lateral meniscus was intact. Medial meniscus had a displaced tear. ACL and PCL were intact. Popliteus tendon intact.  Alternating then between a motorized shaver and a small biter, a partial medial meniscectomy was performed until a balanced stable rim of meniscal tissue remained. At this time, all excess arthroscopic fluid and meniscal debris was removed from the knee joint. Copious irrigation was performed. Portal sites were closed with nylon. Sterile dressings were applied. The patient was awakened from anesthesia and transferred to the recovery room in stable condition with stable vital signs.   COMPLICATIONS: None apparent.   DRAINS: None.   SPECIMENS: None.   ESTIMATED BLOOD LOSS: 5 mL.   TOURNIQUET: No tourniquet was placed.  POSTOPERATIVE PLAN: The patient will be weightbearing as tolerated, range of motion as tolerated, can shower on postoperative day #3. No submerging the wounds. No chemical DVT prophylaxis. Follow up in my Orthopedic Clinic in 1 week time. No running, jumping, pounding sports for 6 weeks.

## 2023-03-15 NOTE — ANESTHESIA CARE TRANSFER NOTE
Patient: Isabel Argueta    Procedure: Procedure(s):  Examination under anesthesia, knee arthroscopy, meniscectomy       Diagnosis: Acute pain of left knee [M25.562]  Diagnosis Additional Information: No value filed.    Anesthesia Type:   General     Note:    Oropharynx: spontaneously breathing and oropharynx clear of all foreign objects  Level of Consciousness: awake  Oxygen Supplementation: face mask  Level of Supplemental Oxygen (L/min / FiO2): 6  Independent Airway: airway patency satisfactory and stable  Dentition: dentition unchanged  Vital Signs Stable: post-procedure vital signs reviewed and stable  Report to RN Given: handoff report given  Patient transferred to: PACU  Comments: Resps easy and regular. Report to PACU RN  Handoff Report: Identifed the Patient, Identified the Reponsible Provider, Reviewed the pertinent medical history, Discussed the surgical course, Reviewed Intra-OP anesthesia mangement and issues during anesthesia, Set expectations for post-procedure period and Allowed opportunity for questions and acknowledgement of understanding      Vitals:  Vitals Value Taken Time   /96 03/15/23 0815   Temp     Pulse 101 03/15/23 0815   Resp 15 03/15/23 0815   SpO2 96 % 03/15/23 0815   Vitals shown include unvalidated device data.    Electronically Signed By: BULMARO CURIEL CRNA  March 15, 2023  8:17 AM

## 2023-03-15 NOTE — DISCHARGE INSTRUCTIONS
[facility]  Home Care Following Anesthesia  For 24 hours after surgery:  Get plenty of rest.  A responsible adult must stay with you for at least 24 hours after you leave the surgery center.  Do not drive or use heavy equipment.  If you have weakness or tingling, don't drive or use heavy equipment until this feeling goes away.   Do not drink alcohol.   Avoid strenuous or risky activities.  Ask for help when climbing stairs.  You may feel lightheaded.  IF so, sit for a few minutes before standing.  Have someone help you get up.   If you have nausea (feel sick to your stomach): Drink only clear liquids such as apple juice, ginger ale, broth or 7-Up.  Rest may also help.  Be sure to drink enough fluids.  Move to a regular diet as you feel able.   You may have a slight fever.  Call the doctor if your fever is over 100 F (37.7 C) (taken under the tongue) or lasts longer than 24 hours.  You may have a dry mouth, a sore throat, muscle aches or trouble sleeping. These should go away after 24 hours.  Do not make important or legal decisions.   It is recommended to avoid smoking.               Tips for taking pain medications  To get the best pain relief possible, remember these points:  Take pain medications as directed, before pain becomes severe.  Pain medication can upset your stomach: taking it with food may help.  Constipation is a common side effect of pain medication. Drink plenty of  fluids.  Eat foods high in fiber. Take a stool softener if recommended by your doctor or pharmacist.  Do not drink alcohol, drive or operate machinery while taking pain medications.  Ask about other ways to control pain, such as with heat, ice or relaxation.    Tylenol/Acetaminophen Consumption  To help encourage the safe use of acetaminophen, the makers of TYLENOL  have lowered the maximum daily dose for single-ingredient Extra Strength TYLENOL  (acetaminophen) products sold in the [place] from 8 pills  per day (4,000 mg) to 6 pills per day (3,000 mg). The dosing interval has also changed from 2 pills every 4-6 hours to 2 pills every 6 hours.  If you feel your pain relief is insufficient, you may take Tylenol/Acetaminophen in addition to your narcotic pain medication.   Be careful not to exceed 3,000 mg of Tylenol/Acetaminophen in a 24 hour period from all sources.  If you are taking extra strength Tylenol/acetaminophen (500 mg), the maximum dose is 6 tablets in 24 hours.  If you are taking regular strength acetaminophen (325 mg), the maximum dose is 9 tablets in 24 hours.  Tylenol given at 06:30 am Next Dose: 12:30 pm    Call a doctor for any of the following:  Signs of infection (fever, growing tenderness at the surgery site, a large amount of drainage or bleeding, severe pain, foul-smelling drainage, redness, swelling).  It has been over 8 to 10 hours since surgery and you are still not able to urinate (pass water).  Headache for over 24 hours.  Numbness, tingling or weakness the day after surgery (if you had spinal anesthesia).  Signs of Covid-19 infection (temperature over 100 degrees, shortness of breath, cough, loss of taste/smell, generalized body aches, persistent headache, chills, sore throat, nausea/vomiting/diarrhea)  Your doctor is:       Dr. Oswaldo Davis, Orthopaedics: [phone]               Or dial [phone] and ask for the resident on call for:  Orthopaedics  For emergency care, call the:  [facility] Emergency Department: [phone] (TTY for hearing impaired: [phone])

## 2023-03-15 NOTE — ANESTHESIA POSTPROCEDURE EVALUATION
Patient: Isabel Argueta    Procedure: Procedure(s):  Examination under anesthesia, knee arthroscopy, meniscectomy       Anesthesia Type:  General    Note:  Disposition: Outpatient   Postop Pain Control: Uneventful            Sign Out: Well controlled pain   PONV: No   Neuro/Psych: Uneventful            Sign Out: Acceptable/Baseline neuro status   Airway/Respiratory: Uneventful            Sign Out: Acceptable/Baseline resp. status   CV/Hemodynamics: Uneventful            Sign Out: Acceptable CV status; No obvious hypovolemia; No obvious fluid overload   Other NRE: NONE   DID A NON-ROUTINE EVENT OCCUR?            Last vitals:  Vitals Value Taken Time   /97 03/15/23 0830   Temp 36.1  C (96.9  F) 03/15/23 0830   Pulse 87 03/15/23 0830   Resp 16 03/15/23 0830   SpO2 95 % 03/15/23 0830       Electronically Signed By: Orlin Vigil MD  March 15, 2023  9:28 AM

## 2023-03-15 NOTE — ANESTHESIA PREPROCEDURE EVALUATION
Anesthesia Pre-Procedure Evaluation    Patient: Isabel Argueta   MRN: [id number] : 1980        Procedure : Procedure(s):  Examination under anesthesia, knee arthroscopy, meniscectomy          Past Medical History:   Diagnosis Date     Diverticul[facility] of large intestine 2013    [facility]     Palpitations       Past Surgical History:   Procedure Laterality Date     CHALAZION EXCISION Left      EYE SURGERY      lid, age 6     [facility] MASTECTOMY FOR GYNECOMASTIA  2018     [facility]     WISDOM TOOTH EXTRACTION        Allergies   Allergen Reactions     Dust Mites       Social History     Tobacco Use     Smoking status: Never     Smokeless tobacco: Never   Substance Use Topics     Alcohol use: Never      Wt Readings from Last 1 Encounters:   23 107.5 kg (237 lb)        Anesthesia Evaluation            ROS/MED HX  ENT/Pulmonary:       Neurologic:       Cardiovascular:     (+) Dyslipidemia -----    METS/Exercise Tolerance:     Hematologic:       Musculoskeletal:       GI/Hepatic:     (+) liver disease (fatty liver),     Renal/Genitourinary:       Endo:     (+) Obesity,     Psychiatric/Substance Use:       Infectious Disease:       Malignancy:       Other:            Physical Exam    Airway        Mallampati: I       Respiratory Devices and Support         Dental           Cardiovascular          Rhythm and rate: regular     Pulmonary           breath sounds clear to auscultation           OUTSIDE LABS:  CBC:   Lab Results   Component Value Date    WBC 7.5 2023    WBC 5.0 2022    HGB 16.3 2023    HGB 16.2 2022    HCT 48.6 2023    HCT 47.8 2022     2023     2022     BMP:   Lab Results   Component Value Date     2023     (L) 2023    POTASSIUM 4.0 2023    POTASSIUM 3.8 2023    CHLORIDE 104 2023    CHLORIDE 97 (L) 2023    CO2 21 (L) 2023    CO2 26 2023    BUN  13.1 02/22/2023    BUN 13.9 02/04/2023    CR 0.95 02/22/2023    CR 1.14 02/04/2023    GLC 97 02/22/2023    GLC 92 02/04/2023     COAGS: No results found for: PTT, INR, FIBR  POC: No results found for: BGM, HCG, HCGS  HEPATIC:   Lab Results   Component Value Date    ALBUMIN 4.6 02/22/2023    PROTTOTAL 7.5 02/22/2023     (H) 02/22/2023    AST 49 02/22/2023    ALKPHOS 71 02/22/2023    BILITOTAL 0.4 02/22/2023     OTHER:   Lab Results   Component Value Date    A1C 5.4 06/02/2022    BR[place]LY[place] 9.9 02/22/2023    TSH 2.21 06/02/2022       Anesthesia Plan    ASA Status:  2   NPO Status:  NPO Appropriate    Anesthesia Type: General.     - Airway: LMA   Induction: Intravenous.   Maintenance: TIVA.        Consents    Anesthesia Plan(s) and associated risks, benefits, and realistic alternatives discussed. Questions answered and patient/representative(s) expressed understanding.    - Discussed:     - Discussed with:  Patient         Postoperative Care    Pain management: IV analgesics, Oral pain medications, Multi-modal analgesia.   PONV prophylaxis: Ondansetron (or other 5HT-3), Dexamethasone or Solumedrol     Comments:                Orlin Vigil MD

## 2023-03-21 ENCOUNTER — THERAPY VISIT (OUTPATIENT)
Dept: PHYSICAL THERAPY | Facility: CLINIC | Age: 43
End: 2023-03-21
Attending: ORTHOPAEDIC SURGERY
Payer: COMMERCIAL

## 2023-03-21 DIAGNOSIS — M25.562 ACUTE PAIN OF LEFT KNEE: ICD-10-CM

## 2023-03-21 PROCEDURE — 97110 THERAPEUTIC EXERCISES: CPT | Mod: GP

## 2023-03-21 PROCEDURE — 97161 PT EVAL LOW COMPLEX 20 MIN: CPT | Mod: GP

## 2023-03-21 PROCEDURE — 97530 THERAPEUTIC ACTIVITIES: CPT | Mod: GP

## 2023-03-21 ASSESSMENT — ACTIVITIES OF DAILY LIVING (ADL)
GIVING WAY, BUCKLING OR SHIFTING OF KNEE: I DO NOT HAVE THE SYMPTOM
GO UP STAIRS: I AM UNABLE TO DO THE ACTIVITY
LIMPING: THE SYMPTOM AFFECTS MY ACTIVITY SLIGHTLY
STAND: ACTIVITY IS NOT DIFFICULT
RAW_SCORE: 42
SQUAT: ACTIVITY IS FAIRLY DIFFICULT
WEAKNESS: I DO NOT HAVE THE SYMPTOM
SWELLING: THE SYMPTOM AFFECTS MY ACTIVITY SLIGHTLY
PAIN: THE SYMPTOM AFFECTS MY ACTIVITY SLIGHTLY
HOW_WOULD_YOU_RATE_THE_CURRENT_FUNCTION_OF_YOUR_KNEE_DURING_YOUR_USUAL_DAILY_ACTIVITIES_ON_A_SCALE_FROM_0_TO_100_WITH_100_BEING_YOUR_LEVEL_OF_KNEE_FUNCTION_PRIOR_TO_YOUR_INJURY_AND_0_BEING_THE_INABILITY_TO_PERFORM_ANY_OF_YOUR_USUAL_DAILY_ACTIVITIES?: 50
STIFFNESS: THE SYMPTOM AFFECTS MY ACTIVITY MODERATELY
WALK: ACTIVITY IS SOMEWHAT DIFFICULT
KNEE_ACTIVITY_OF_DAILY_LIVING_SCORE: 60
KNEE_ACTIVITY_OF_DAILY_LIVING_SUM: 42
RISE FROM A CHAIR: ACTIVITY IS NOT DIFFICULT
GO DOWN STAIRS: I AM UNABLE TO DO THE ACTIVITY
KNEEL ON THE FRONT OF YOUR KNEE: ACTIVITY IS VERY DIFFICULT
SIT WITH YOUR KNEE BENT: ACTIVITY IS NOT DIFFICULT
HOW_WOULD_YOU_RATE_THE_OVERALL_FUNCTION_OF_YOUR_KNEE_DURING_YOUR_USUAL_DAILY_ACTIVITIES?: ABNORMAL
AS_A_RESULT_OF_YOUR_KNEE_INJURY,_HOW_WOULD_YOU_RATE_YOUR_CURRENT_LEVEL_OF_DAILY_ACTIVITY?: ABNORMAL

## 2023-03-21 NOTE — PROGRESS NOTES
Physical Therapy Initial Evaluation  Subjective:  The history is provided by the patient. No  was used.   Patient Health History  Isabel Argueta being seen for Meniscus tear.     Problem began: 11/24/2022.   Problem occurred: I turned when standing.   Pain is reported as 2/10 on pain scale.  General health as reported by patient is good.  Pertinent medical history includes: overweight.     Medical allergies: none.   Surgeries include:  Other. Other surgery history details: Gynocomastia.    Current medications:  Other. Other medications details: Omemprozole ans Flonase.    Current occupation is Hospital  and student.   Primary job tasks include:  Computer work and other.   Other job/home tasks details: Walking.                                  Physical Therapy Initial Evaluation: Subjective History  Date of Surgery: 3/15/2023.    Surgical Procedure/Limb: L knee scope, meniscectomy  Surgeon Name: Dr. Davis  Precautions/Restrictions: WBAT, ROMAT    Average Daily Pain Levels: 2-4/10 (Location: anterior, posterior; Quality: Tender and aching, sharp if he turns a certain way)  Other Symptoms: swelling  Symptom Mgmt Strategies: hasn't taken meds since first day, icing, elevating    Prior orthopaedic history/procedures: none  Prior non-operative management: bracing, did not do PT. Was having difficulty with stairs after injury which initially occurred on Thanksgiving Day. He turned, his foot stay and his knee went. It felt off immediately.  Next MD Appt Date: 3/27/2023    Functional limitations following procedure: use of single crutch, has not done full flight of stairs yet  Previous level of function: Independent, no regular exercise outside of walking    Patient Employment:  at the hospital  Desired Physical Activity (Goals): kneeling for prayer, walking, stairs, stand up paddle boarding    Post-Operative Physical Therapy Examination    Physical Mobility Status  Gait: single  crutch, initially using on L side with L lean. Full WBing.  Transfers:  IND    Anthropometric Measures     Right Left Difference   Joint ROM      Hyperextension NA NA    Extension 0 deg 2 deg 2 deg   Flexion 135 deg 114 deg 21 deg   Circumferential Measures      Joint Line 42 cm 43 cm 1 cm   Effusion 0 1+        Quadriceps Muscle Activation Right Left   Isometric Quad Activation Normal Depressed intensity   Straight Leg Raising No extensor lag Extensor lag of ~5 deg     Status of Incision: Clean & healing    Assessment/Plan    Patient is a 42 year old male with left side knee complaints.    Patient has the following significant findings with corresponding treatment plan.                Diagnosis 1:  L knee meniscectomy  Pain -  hot/cold therapy, manual therapy, splint/taping/bracing/orthotics, self management, education and home program  Decreased ROM/flexibility - manual therapy, therapeutic exercise, therapeutic activity and home program  Decreased joint mobility - manual therapy, therapeutic exercise, therapeutic activity and home program  Decreased strength - therapeutic exercise, therapeutic activities and home program  Impaired balance - neuro re-education, gait training, therapeutic activities, adaptive equipment/assistive device and home program  Edema - vasopneumatics, cold therapy and self management/home program  Impaired gait - gait training, assistive devices and home program  Decreased function - therapeutic activities, home program and functional performance testing    Therapy Evaluation Codes:   Cumulative Therapy Evaluation is: Low complexity.    Previous and current functional limitations:  (See Goal Flow Sheet for this information)    Short term and Long term goals: (See Goal Flow Sheet for this information)     Communication ability:  Patient appears to be able to clearly communicate and understand verbal and written communication and follow directions correctly.  Treatment Explanation - The  following has been discussed with the patient:   RX ordered/plan of care  Anticipated outcomes  Possible risks and side effects  This patient would benefit from PT intervention to resume normal activities.   Rehab potential is excellent.    Frequency:  1 X week, once daily  Duration:  for 8 weeks  Discharge Plan:  Achieve all LTG.  Independent in home treatment program.  Reach maximal therapeutic benefit.    Please refer to the daily flowsheet for treatment today, total treatment time and time spent performing 1:1 timed codes.

## 2023-03-27 ENCOUNTER — OFFICE VISIT (OUTPATIENT)
Dept: ORTHOPEDICS | Facility: CLINIC | Age: 43
End: 2023-03-27
Payer: COMMERCIAL

## 2023-03-27 VITALS — WEIGHT: 230 LBS | BODY MASS INDEX: 31.15 KG/M2 | HEIGHT: 72 IN

## 2023-03-27 DIAGNOSIS — M25.562 ACUTE PAIN OF LEFT KNEE: Primary | ICD-10-CM

## 2023-03-27 PROCEDURE — 99024 POSTOP FOLLOW-UP VISIT: CPT | Mod: GC | Performed by: ORTHOPAEDIC SURGERY

## 2023-03-27 NOTE — PROGRESS NOTES
Patient seen and examined with the fellow. I also personally reviewed the images and interpreted the imaging myself.     Assesment: left medial meniscus tear, 1 week following meniscectomy      Plan: Weightbearing as tolerated    Range of motion as tolerated    Sutures removed in clinic    Leave steri-strips in place until they fall off    OK to shower allowing water to run over incision    No soaking, scrubbing, baths, or lake for 1 additional week    Continue PT as scheduled     Pain medications reviewed and no refills required.     Operative report provided and arthroscopic images reviewed    Follow up at 6 weeks from the date of surgery with no new X-Rays needed    I agree with history, physical and imaging as well as the assessment and plan as detailed by Dr. Hensley.

## 2023-03-27 NOTE — PROGRESS NOTES
DIAGNOSIS:   1. Left medial mensicus tear    PROCEDURES:  1. 3/15/23 Left knee partial medial menisectomy    HISTORY:  Patient doing very well 2 weeks after surgery. Has been ambulating and weightbearing without issue. Pain well controlled. States knee feels better than before surgery. Denies fevers or chills.     EXAM:     General: Awake, Alert, and oriented. Articulates and communicates with a normal affect     Left Lower Extremity:    Incisions without evidence of infection, sutures removed and steri strips placed    Normal post-operative effusion and ecchymosis    Range of motion from full extension to 130 degrees of flexion with no pain    Neurovascularly intact    IMAGING:  No new imaging    ASSESSMENT:  1. 42M 2 weeks s/p left knee partial medial menisectomy    PLAN:   F/u 6 weeks s/p surgery  Continue WBAT   PT   All questions and concerns were answered

## 2023-03-27 NOTE — LETTER
3/27/2023         RE: Isabel Argueta  [address]  [place] [address]        Dear Colleague,    Thank you for referring your patient, Isabel Argueta, to the [facility] [place]. Please see a copy of my visit note below.    DIAGNOSIS:   1. Left medial mensicus tear    PROCEDURES:  1. 3/15/23 Left knee partial medial menisectomy    HISTORY:  Patient doing very well 2 weeks after surgery. Has been ambulating and weightbearing without issue. Pain well controlled. States knee feels better than before surgery. Denies fevers or chills.     EXAM:     General: Awake, Alert, and oriented. Articulates and communicates with a normal affect     Left Lower Extremity:    Incisions without evidence of infection, sutures removed and steri strips placed    Normal post-operative effusion and ecchymosis    Range of motion from full extension to 130 degrees of flexion with no pain    Neurovascularly intact    IMAGING:  No new imaging    ASSESSMENT:  1. 42M 2 weeks s/p left knee partial medial menisectomy    PLAN:   F/u 6 weeks s/p surgery  Continue WBAT   PT   All questions and concerns were answered    Patient seen and examined with the fellow. I also personally reviewed the images and interpreted the imaging myself.     Assesment: left medial meniscus tear, 1 week following meniscectomy      Plan: Weightbearing as tolerated    Range of motion as tolerated    Sutures removed in clinic    Leave steri-strips in place until they fall off    OK to shower allowing water to run over incision    No soaking, scrubbing, baths, or lake for 1 additional week    Continue PT as scheduled     Pain medications reviewed and no refills required.     Operative report provided and arthroscopic images reviewed    Follow up at 6 weeks from the date of surgery with no new X-Rays needed    I agree with history, physical and imaging as well as the assessment and plan as detailed by Dr. Hensley.          Again, thank you for allowing me to participate in the care of your patient.        Sincerely,        Oswaldo Davis MD

## 2023-03-30 ENCOUNTER — THERAPY VISIT (OUTPATIENT)
Dept: PHYSICAL THERAPY | Facility: CLINIC | Age: 43
End: 2023-03-30
Payer: COMMERCIAL

## 2023-03-30 DIAGNOSIS — M25.562 ACUTE PAIN OF LEFT KNEE: Primary | ICD-10-CM

## 2023-03-30 PROCEDURE — 97110 THERAPEUTIC EXERCISES: CPT | Mod: GP | Performed by: PHYSICAL THERAPIST

## 2023-03-30 PROCEDURE — 97530 THERAPEUTIC ACTIVITIES: CPT | Mod: GP | Performed by: PHYSICAL THERAPIST

## 2023-03-30 PROCEDURE — 97016 VASOPNEUMATIC DEVICE THERAPY: CPT | Mod: GP | Performed by: PHYSICAL THERAPIST

## 2023-04-08 NOTE — ADDENDUM NOTE
Addendum  created 04/07/23 2008 by Orlin Vigil MD    Attestation recorded in Intraprocedure, Intraprocedure Attestations filed

## 2023-05-01 ENCOUNTER — OFFICE VISIT (OUTPATIENT)
Dept: ORTHOPEDICS | Facility: CLINIC | Age: 43
End: 2023-05-01
Payer: COMMERCIAL

## 2023-05-01 VITALS — WEIGHT: 230 LBS | HEIGHT: 72 IN | BODY MASS INDEX: 31.15 KG/M2

## 2023-05-01 DIAGNOSIS — M25.562 CHRONIC PAIN OF LEFT KNEE: Primary | ICD-10-CM

## 2023-05-01 DIAGNOSIS — G89.29 CHRONIC PAIN OF LEFT KNEE: Primary | ICD-10-CM

## 2023-05-01 PROCEDURE — 99024 POSTOP FOLLOW-UP VISIT: CPT | Mod: GC | Performed by: ORTHOPAEDIC SURGERY

## 2023-05-01 NOTE — PROGRESS NOTES
For partial meniscectomy [facility] ORTHOPEDIC CLINIC FOLLOW-UP    Ddx: Left knee medial meniscal tear  Surgery: Left knee arthroscopy, partial medial meniscectomy  DATE OF SURGERY: March 15, 2023    HISTORY OF PRESENT ILLNESS:  Isabel Argueta is 6 weeks s/p above listed surgical intervention. Patients presents today for follow-up.  Patient is doing well.  He is happy with his progress.  He feels like his gait is overall improving since surgery.  He has no pain.  He has returned to work.  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     He is unable to kneel still on his knee for prayers.    PHYSICAL EXAM:  VITAL SIGNS: Ht 1.829 m (6')   Wt 104.3 kg (230 lb)   BMI 31.19 kg/m    RESP: Non labored breathing  MUSCULOSKELETAL:      Left knee exam:  Incisions are well-healed  No effusion is present at the knee  Knee range of motion complete extension to 120 degrees of flexion  He is stable to varus valgus stress  Lachman's Ia  Posterior drawer test negative  Distally CMS intact    IMAGING: No new imaging obtained today    ASSESSMENT:  Isabel Argueta is now 6 weeks s/p above listed surgical intervention.    PLAN:  Activity as tolerated  We expect the patient will continue to improve and his activity level and symptoms up until 4 months from surgery.  Follow-up PRN    Patient seen and discussed with Dr. Davis.    Mega Kennedy MD  Orthopaedic Surgery, PGY-5

## 2023-05-01 NOTE — NURSING NOTE
Reason For Visit:   Chief Complaint   Patient presents with     Left Knee - RECHECK     DOS 3/15/2023 left knee arthroscopy and meniscectomy     ?  No    Date of surgery: 3/15/2023  Type of surgery:   PROCEDURES:   1. Examination under anesthesia, left knee.   2. Left knee arthroscopy, partial medial meniscectomy.       Patient reports that his recovery is going well. He did attend two physical therapy appointments. He has a goal to return to his prayer position, he attempted to try but still has pain.     He has been able to return to walking for three miles, he will try 2-3 days per week due to weather.     SANE Score  Left Knee: 90%  Right Knee: 100%    Pain Assessment  Patient Currently in Pain: No  Primary Pain Location: Knee    Ht 1.829 m (6')   Wt 104.3 kg (230 lb)   BMI 31.19 kg/m           Allergies   Allergen Reactions     Dust Mites        Current Outpatient Medications   Medication     fluticasone (FLONASE) 50 MCG/ACT nasal spray     magnesium 250 MG tablet     Multiple Vitamin (MULTIVITAMIN ADULT PO)     Omega-3 Fatty Acids (FISH OIL PO)     omeprazole (PRILOSEC OTC) 20 MG EC tablet     RESTASIS 0.05 % ophthalmic emulsion     acetaminophen (TYLENOL) 325 MG tablet     hydrOXYzine (VISTARIL) 25 MG capsule     ondansetron (ZOFRAN ODT) 4 MG ODT tab     oxyCODONE (ROXICODONE) 5 MG tablet     senna-docusate (SENOKOT-S/PERICOLACE) 8.6-50 MG tablet     No current facility-administered medications for this visit.         Cynthia Rodriguez, ATC

## 2023-05-01 NOTE — PROGRESS NOTES
Patient seen and examined with the resident. I also personally reviewed the images and interpreted the imaging myself.     Assesment: 6 weeks following partial meniscectomy left knee.  Doing well.    Plan: Weightbearing as tolerated, range of motion as tolerated  No specific restrictions  Transition back to desired activities  Follow-up as needed, full convalescence 4 to 6 months    I agree with history, physical and imaging as well as the assessment and plan as detailed by Dr. Kennedy.

## 2023-05-01 NOTE — LETTER
5/1/2023         RE: Isabel Argueta  [address]  [place] [address]        Dear Colleague,    Thank you for referring your patient, Isabel Argueta, to the [facility] [place]. Please see a copy of my visit note below.    For partial meniscectomy [facility] ORTHOPEDIC CLINIC FOLLOW-UP    Ddx: Left knee medial meniscal tear  Surgery: Left knee arthroscopy, partial medial meniscectomy  DATE OF SURGERY: March 15, 2023    HISTORY OF PRESENT ILLNESS:  Isabel Argueta is 6 weeks s/p above listed surgical intervention. Patients presents today for follow-up.  Patient is doing well.  He is happy with his progress.  He feels like his gait is overall improving since surgery.  He has no pain.  He has returned to work.  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    PHYSICAL EXAM:  VITAL SIGNS: Ht 1.829 m (6')   Wt 104.3 kg (230 lb)   BMI 31.19 kg/m    RESP: Non labored breathing  MUSCULOSKELETAL:      Left knee exam:  Incisions are well-healed  No effusion is present at the knee  Knee range of motion complete extension to 120 degrees of flexion  He is stable to varus valgus stress  Lachman's Ia  Posterior drawer test negative  Distally CMS intact    IMAGING: No new imaging obtained today    ASSESSMENT:  Isabel Argueta is now 6 weeks s/p above listed surgical intervention.    PLAN:  Activity as tolerated  We expect the patient will continue to improve and his activity level and symptoms up until 4 months from surgery.  Follow-up PRN    Patient seen and discussed with Dr. Davis.    Mega Kennedy MD  Orthopaedic Surgery, PGY-5      Patient seen and examined with the resident. I also personally reviewed the images and interpreted the imaging myself.     Assesment: 6 weeks following partial meniscectomy left knee.  Doing well.    Plan: Weightbearing as tolerated, range of motion as tolerated  No  specific restrictions  Transition back to desired activities  Follow-up as needed, full convalescence 4 to 6 months    I agree with history, physical and imaging as well as the assessment and plan as detailed by Dr. Kennedy.         Again, thank you for allowing me to participate in the care of your patient.        Sincerely,        Oswaldo Davis MD

## 2023-06-15 ENCOUNTER — OFFICE VISIT (OUTPATIENT)
Dept: FAMILY MEDICINE | Facility: CLINIC | Age: 43
End: 2023-06-15
Payer: COMMERCIAL

## 2023-06-15 VITALS
TEMPERATURE: 98.3 F | OXYGEN SATURATION: 100 % | WEIGHT: 227.5 LBS | BODY MASS INDEX: 30.81 KG/M2 | DIASTOLIC BLOOD PRESSURE: 82 MMHG | HEIGHT: 72 IN | SYSTOLIC BLOOD PRESSURE: 124 MMHG | RESPIRATION RATE: 22 BRPM | HEART RATE: 73 BPM

## 2023-06-15 DIAGNOSIS — S29.019A THORACIC MYOFASCIAL STRAIN, INITIAL ENCOUNTER: ICD-10-CM

## 2023-06-15 DIAGNOSIS — K76.0 FATTY LIVER: Primary | ICD-10-CM

## 2023-06-15 PROCEDURE — 99214 OFFICE O/P EST MOD 30 MIN: CPT | Performed by: FAMILY MEDICINE

## 2023-06-15 ASSESSMENT — PAIN SCALES - GENERAL: PAINLEVEL: MILD PAIN (2)

## 2023-06-15 NOTE — PROGRESS NOTES
Assessment/Plan.    Non-alcoholic fatty liver disease.  Persistent ALT elevation.  Patient reports that extensive work-up at [facility] around 2008 was negative.  Will check ultrasound and elastography.  If evidence of advanced fibrosis, will refer to hepatology.  If studies are reassuring, will work on weight loss and repeat imaging about 5 years.    Left upper back pain.  Suspect muscle strain in the scapular region.  Could be thoracic disc herniation, though this seems less likely.  Stretching handout provided.  If symptoms persist for more than 12 weeks, will consider imaging.  We also discussed prednisone burst, but I advised against this given that patient is recovering from knee surgery and he reported feeling strange with previous prednisone burst.    Orders Placed This Encounter   Procedures     US Elastography with Abdomen Limited     ----  Chief complaint: Back Pain (Numbness/ tingling) and RECHECK (Liver)    Social History     Social History Narrative    Name is pronounced like Jemma.        Works as [employer]  at [employer].     Chronic nasal congestion.  Uses nasal saline most days.  Using Flonase inconsistently.  Will see ENT soon.    Fatty liver disease.  Biopsy at [facility] in 2008 showed non-alcoholic fatty liver per patient.  He also had extensive blood test at that time.  He denies a family history of liver disease.  Denies recent alcohol use.  Denies heavy NSAID or acetaminophen use.  Denies taking herbal medications or supplements other than those on medication list inChart.  In the past, tried to achieve weight loss to help with fatty liver, but dieting was unsuccessful.    Left scapula/upper thoracic pain.  This started around May 7.  Mildly painful, more of a tingling sensation.  Patient thinks that the pain started after he was working on his car.  He had his arms extended above his shoulders for prolonged period of time.  He reports that reaching up to tie his hair  "will often trigger discomfort.  Patient denies pain with laying down or pain that wakes him from sleep.  He denies radiation of pain to arms or legs.  He is right-handed.  He denies a history of chronic back pain or back surgery.    Exam  /82 (BP Location: Left arm, Patient Position: Sitting, Cuff Size: Adult Regular)   Pulse 73   Temp 98.3  F (36.8  C) (Temporal)   Resp 22   Ht 1.819 m (5' 11.61\")   Wt 103.2 kg (227 lb 8 oz)   SpO2 100%   BMI 31.19 kg/m       No thoracic spinous process tenderness.  Mild discomfort in left upper thoracic paraspinal area.  "

## 2023-06-20 PROBLEM — M25.562 ACUTE PAIN OF LEFT KNEE: Status: RESOLVED | Noted: 2023-02-13 | Resolved: 2023-06-20

## 2023-06-20 NOTE — PROGRESS NOTES
[facility]                   New Patient Visit      Encounter date: June 2, 2023    Referring Provider:   Bess Gongora, BULMARO CNP  [address]  [place] [address]    Chief Complaint: congestion of paranasal sinuses    History of Present Illness: Isabel Argueta congestion of the paranasal sinuses.  He complains of worsening congestion and clear mucoid rhinorrhea bilaterally that worsens during the wintertime.  It is better now.  He had allergy testing in his 20s, but does not recall the details of the results.  Currently he has no issues breathing, no facial pain or pressure, no mucopurulent drainage from the nose.  No prior history of sinus or nasal surgery.  No asthma history, although he does note a history where he ingested a fluid with mold in it.  He was treated for asthma-type symptoms, but these have since resolved.  He does not require any additional antibiotics or steroids for this nor does he require for them for sinusitis flares.        Review of systems: A 14-point review of systems has been conducted and was negative for any notable symptoms, except as dictated in the history of present illness.     Past Medical History:   Diagnosis Date     Diverticulosis of large intestine 2013    CT at [facility]     Palpitations         Past Surgical History:   Procedure Laterality Date     ARTHROSCOPY KNEE WITH MEDIAL MENISCECTOMY Left 3/15/2023    Procedure: Examination under anesthesia, knee arthroscopy, meniscectomy;  Surgeon: Oswaldo Davis MD;  Location: [facility] OR     CHALAZION EXCISION Left 2003     EYE SURGERY      lid, age 6     HC MASTECTOMY FOR GYNECOMASTIA  2018     LIVER BIOPSY      [facility]     WISDOM TOOTH EXTRACTION          Family History   Problem Relation Age of Onset     Diabetes Mother      Thyroid Cancer Mother      Hypertension Father      Colon Polyps Father      Asthma Brother      No Known Problems Brother       Glaucoma Brother      No Known Problems Maternal Grandmother      No Known Problems Maternal Grandfather      No Known Problems Paternal Grandmother      No Known Problems Paternal Grandfather      Diabetes Type 2  Daughter      No Known Problems Daughter      No Known Problems Daughter      No Known Problems Daughter      No Known Problems Daughter      No Known Problems Son      No Known Problems Son      No Known Problems Son         Social History     Socioeconomic History     Marital status:    Tobacco Use     Smoking status: Never     Smokeless tobacco: Never   Vaping Use     Vaping status: Never Used     Passive vaping exposure: Yes   Substance and Sexual Activity     Alcohol use: Never     Drug use: Never     Sexual activity: Yes     Partners: Female     Birth control/protection: I.U.D.     Comment: ; monagamous   Social History Narrative    Name is pronounced like Jemma.        Works as L[employer] day  at [employer].        Physical Exam:  Vital signs: There were no vitals taken for this visit.   General Appearance: No acute distress, appropriate demeanor, conversant  Eyes: moist conjunctivae; EOMI; pupils symmetric; visual acuity grossly intact; no proptosis  Head: normocephalic; overall symmetric appearance without deformity  Face: overall symmetric without deformity; HB I/VI  Ears: Normal appearance of external ear; external meatus normal in appearance; TMs intact without perforation bilaterally;   Nose: No external deformity; septum deviated to left causing greater than 70% obstruction; inferior turbinates with significant hypertrophy bilaterally  Oral Cavity/oropharynx: Normal appearance of mucosa; tongue midline; no mass or lesions; oropharynx without obvious mucosal abnormality  Neck: no palpable lymphadenopathy; thyroid without palpable nodules  Lungs: symmetric chest rise; no wheezing  CV: Good distal perfusion; normal heart rate  Extremities: No deformity  Neurologic Exam:  Cranial nerves II-XII are grossly intact; no focal deficit      Procedure Note  Procedure performed: Rigid nasal endoscopy  Indication: To evaluate for sinonasal pathology not visualized on routine anterior rhinoscopy  Anesthesia: 4% topical lidocaine with 0.05% oxymetazoline  Description of procedure: A 30 degree, 3 mm rigid endoscope was inserted into bilateral nasal cavities and the nasal valve, nasal cavity, middle meatus, sphenoethmoid recess, and nasopharynx were thoroughly evaluated for evidence of obstruction, edema, purulence, polyps and/or mass/lesion.     Rufino-Jorge Endoscopic Scoring System  Endoscopic observation Right Left   Polyps in middle meatus (0 = absent, 1 = restricted to middle meatus, 2 = Beyond middle meatus) 0 0   Discharge (0 = absent, 1 = thin and clear, 2 = thick, purulent) 0 0   Edema (0 = absent, 1 = mild-moderate, 2 = moderate-severe) 0 0   Crusting (0 = absent, 1 = mild-moderate, 2 = moderate-severe) 0 0   Scarring (0= absent, 1 = mild-moderate, 2 = moderate-severe) 0 0   Total 0 0     Findings  RT: MM and SER clear  LT: MM And SER clear    Nasopharynx clear    The patient tolerated the procedure well without complication.     Laboratory Review:  n/a    Imaging Review:  n/a    Pathology Review:  n/a    Assessment/Medical Decision Making:  Nasal obstruction  Deviated nasal septum  Inferior turbinate hypertrophy  Perennial allergic rhinitis      Plan:  Nasal endoscopy is pristine today.  He has had some improvement in his symptoms with saline irrigations and Flonase when used as needed.  He may do this daily if it provides some symptomatic relief.  Otherwise, he may continue during exacerbation of symptoms only.  I performed nasal endoscopy with and reviewed my findings with him.  He was overall very reassured about his normal exam.  We discussed symptoms of sinusitis, and related symptoms which would require him to reach out to us sooner.  He may follow-up with me as needed at this  point.      Fransico Richardson MD    [facility]  [facility] for Skull Base and Pituitary Surgery  [facility]  Department of Otolaryngology - Head & Neck Surgery    The above documentation was completed with the aid of voice recognition dictation software, and as a result, unexpected dictation errors may occur. Please don't hesitate to contact me for any clarification needed regarding this note.

## 2023-06-21 ENCOUNTER — PRE VISIT (OUTPATIENT)
Dept: OTOLARYNGOLOGY | Facility: CLINIC | Age: 43
End: 2023-06-21

## 2023-06-21 ENCOUNTER — OFFICE VISIT (OUTPATIENT)
Dept: OTOLARYNGOLOGY | Facility: CLINIC | Age: 43
End: 2023-06-21
Attending: NURSE PRACTITIONER
Payer: COMMERCIAL

## 2023-06-21 ENCOUNTER — PATIENT OUTREACH (OUTPATIENT)
Dept: CARE COORDINATION | Facility: CLINIC | Age: 43
End: 2023-06-21

## 2023-06-21 VITALS
WEIGHT: 228 LBS | SYSTOLIC BLOOD PRESSURE: 125 MMHG | BODY MASS INDEX: 30.88 KG/M2 | OXYGEN SATURATION: 98 % | DIASTOLIC BLOOD PRESSURE: 87 MMHG | HEIGHT: 72 IN | HEART RATE: 79 BPM

## 2023-06-21 DIAGNOSIS — J34.2 DEVIATED NASAL SEPTUM: ICD-10-CM

## 2023-06-21 DIAGNOSIS — J30.89 NON-SEASONAL ALLERGIC RHINITIS, UNSPECIFIED TRIGGER: Primary | ICD-10-CM

## 2023-06-21 DIAGNOSIS — R09.81 NASAL CONGESTION: ICD-10-CM

## 2023-06-21 DIAGNOSIS — J34.3 HYPERTROPHY OF INFERIOR NASAL TURBINATE: ICD-10-CM

## 2023-06-21 DIAGNOSIS — R09.81 CONGESTION OF PARANASAL SINUS: ICD-10-CM

## 2023-06-21 PROCEDURE — 31231 NASAL ENDOSCOPY DX: CPT | Performed by: OTOLARYNGOLOGY

## 2023-06-21 PROCEDURE — 99203 OFFICE O/P NEW LOW 30 MIN: CPT | Mod: 25 | Performed by: OTOLARYNGOLOGY

## 2023-06-21 ASSESSMENT — PAIN SCALES - GENERAL: PAINLEVEL: NO PAIN (0)

## 2023-06-21 NOTE — LETTER
6/21/2023       RE: Isabel Argueta  [address]  [facility] [address]     Dear Colleague,    Thank you for referring your patient, Isabel Argueta, to the [facility] [place] at [facility]. Please see a copy of my visit note below.                         [facility]                     New Patient Visit      Encounter date: June 2, 2023    Referring Provider:   Bess Gongora, APRN CNP  [address]  [place] [address]    Chief Complaint: congestion of paranasal sinuses    History of Present Illness: Isabel Argueta congestion of the paranasal sinuses.  He complains of worsening congestion and clear mucoid rhinorrhea bilaterally that worsens during the wintertime.  It is better now.  He had allergy testing in his 20s, but does not recall the details of the results.  Currently he has no issues breathing, no facial pain or pressure, no mucopurulent drainage from the nose.  No prior history of sinus or nasal surgery.  No asthma history, although he does note a history where he ingested a fluid with mold in it.  He was treated for asthma-type symptoms, but these have since resolved.  He does not require any additional antibiotics or steroids for this nor does he require for them for sinusitis flares.        Review of systems: A 14-point review of systems has been conducted and was negative for any notable symptoms, except as dictated in the history of present illness.     Past Medical History:   Diagnosis Date    Diverticulosis of large intestine 2013    CT at [facility]    Palpitations         Past Surgical History:   Procedure Laterality Date    ARTHROSCOPY KNEE WITH MEDIAL MENISCECTOMY Left 3/15/2023    Procedure: Examination under anesthesia, knee arthroscopy, meniscectomy;  Surgeon: Oswaldo Davis MD;  Location: [facility] OR    CHALAZION EXCISION Left 2003    EYE  SURGERY      lid, age 6    [facility] MASTECTOMY FOR GYNECOMASTIA  2018    LIVER BIOPSY      [facility]    WISDOM TOOTH EXTRACTION          Family History   Problem Relation Age of Onset    Diabetes Mother     Thyroid Cancer Mother     Hypertension Father     Colon Polyps Father     Asthma Brother     No Known Problems Brother     Glaucoma Brother     No Known Problems Maternal Grandmother     No Known Problems Maternal Grandfather     No Known Problems Paternal Grandmother     No Known Problems Paternal Grandfather     Diabetes Type 2  Daughter     No Known Problems Daughter     No Known Problems Daughter     No Known Problems Daughter     No Known Problems Daughter     No Known Problems Son     No Known Problems Son     No Known Problems Son         Social History     Socioeconomic History    Marital status:    Tobacco Use    Smoking status: Never    Smokeless tobacco: Never   Vaping Use    Vaping status: Never Used     Passive vaping exposure: Yes   Substance and Sexual Activity    Alcohol use: Never    Drug use: Never    Sexual activity: Yes     Partners: Female     Birth control/protection: I.U.D.     Comment: ; monagamous   Social History Narrative    Name is pronounced like Jemma.        Works as [employer]  at [employer].        Physical Exam:  Vital signs: There were no vitals taken for this visit.   General Appearance: No acute distress, appropriate demeanor, conversant  Eyes: moist conjunctivae; EOMI; pupils symmetric; visual acuity grossly intact; no proptosis  Head: normocephalic; overall symmetric appearance without deformity  Face: overall symmetric without deformity; HB I/VI  Ears: Normal appearance of external ear; external meatus normal in appearance; TMs intact without perforation bilaterally;   Nose: No external deformity; septum deviated to left causing greater than 70% obstruction; inferior turbinates with significant hypertrophy bilaterally  Oral Cavity/oropharynx: Normal  appearance of mucosa; tongue midline; no mass or lesions; oropharynx without obvious mucosal abnormality  Neck: no palpable lymphadenopathy; thyroid without palpable nodules  Lungs: symmetric chest rise; no wheezing  CV: Good distal perfusion; normal heart rate  Extremities: No deformity  Neurologic Exam: Cranial nerves II-XII are grossly intact; no focal deficit      Procedure Note  Procedure performed: Rigid nasal endoscopy  Indication: To evaluate for sinonasal pathology not visualized on routine anterior rhinoscopy  Anesthesia: 4% topical lidocaine with 0.05% oxymetazoline  Description of procedure: A 30 degree, 3 mm rigid endoscope was inserted into bilateral nasal cavities and the nasal valve, nasal cavity, middle meatus, sphenoethmoid recess, and nasopharynx were thoroughly evaluated for evidence of obstruction, edema, purulence, polyps and/or mass/lesion.     Rufino-Jorge Endoscopic Scoring System  Endoscopic observation Right Left   Polyps in middle meatus (0 = absent, 1 = restricted to middle meatus, 2 = Beyond middle meatus) 0 0   Discharge (0 = absent, 1 = thin and clear, 2 = thick, purulent) 0 0   Edema (0 = absent, 1 = mild-moderate, 2 = moderate-severe) 0 0   Crusting (0 = absent, 1 = mild-moderate, 2 = moderate-severe) 0 0   Scarring (0= absent, 1 = mild-moderate, 2 = moderate-severe) 0 0   Total 0 0     Findings  RT: MM and SER clear  LT: MM And SER clear    Nasopharynx clear    The patient tolerated the procedure well without complication.     Laboratory Review:  n/a    Imaging Review:  n/a    Pathology Review:  n/a    Assessment/Medical Decision Making:  Nasal obstruction  Deviated nasal septum  Inferior turbinate hypertrophy  Perennial allergic rhinitis      Plan:  Nasal endoscopy is pristine today.  He has had some improvement in his symptoms with saline irrigations and Flonase when used as needed.  He may do this daily if it provides some symptomatic relief.  Otherwise, he may continue during  exacerbation of symptoms only.  I performed nasal endoscopy with and reviewed my findings with him.  He was overall very reassured about his normal exam.  We discussed symptoms of sinusitis, and related symptoms which would require him to reach out to us sooner.  He may follow-up with me as needed at this point.      Fransico Richardson MD    [facility]  [facility] for [facility] Pituitary Surgery  [facility]  Department of Otolaryngology - Head & Neck Surgery    The above documentation was completed with the aid of voice recognition dictation software, and as a result, unexpected dictation errors may occur. Please don't hesitate to contact me for any clarification needed regarding this note.           Again, thank you for allowing me to participate in the care of your patient.      Sincerely,    Fransico Richardson MD

## 2023-06-21 NOTE — PATIENT INSTRUCTIONS
You were seen in the ENT Clinic today by Dr. Richardson. If you have any questions or concerns after your appointment, please contact us (see below)       2.   The following recommendations have been made based upon your appointment today:   -Continue sinus rinses as needed.   -Continue Flonase nasal spray as needed.      3.   Please return to the ENT clinic as needed.           How to Contact Us:  Send a VideoAvatars message to your provider. Our team will respond to you via VideoAvatars. Occasionally, we will need to call you to get further information.  For urgent matters (Monday-Friday), call the ENT Clinic: [phone] and speak with a call center team member - they will route your call appropriately.   If you'd like to speak directly with a nurse, please find our contact information below. We do our best to check voicemail frequently throughout the day, and will work to call you back within 1-2 days. For urgent matters, please use the general clinic phone numbers listed above.        Delmi TREJO RN  ENT RN Care Coordinator  Direct: [phone]  Myra KIRK LPN  Direct: [phone]         [facility]  Department of Otolaryngology

## 2023-06-30 ENCOUNTER — ANCILLARY PROCEDURE (OUTPATIENT)
Dept: ULTRASOUND IMAGING | Facility: CLINIC | Age: 43
End: 2023-06-30
Attending: FAMILY MEDICINE
Payer: COMMERCIAL

## 2023-06-30 DIAGNOSIS — K76.0 FATTY LIVER: ICD-10-CM

## 2023-06-30 PROCEDURE — 76981 USE PARENCHYMA: CPT | Mod: GC | Performed by: RADIOLOGY

## 2023-07-05 ENCOUNTER — PATIENT OUTREACH (OUTPATIENT)
Dept: CARE COORDINATION | Facility: CLINIC | Age: 43
End: 2023-07-05
Payer: COMMERCIAL

## 2023-07-07 NOTE — CONFIDENTIAL NOTE
DIAGNOSIS:    Fatty liver   Liver disease      Appt Date: 08.14.2023   NOTES STATUS DETAILS   OFFICE NOTE from referring provider Internal 06.15.2023 Barry Garay MD   OFFICE NOTES from other specialists     DISCHARGE SUMMARY from hospital     MEDICATION LIST Internal    LIVER BIOSPY (IF APPLICABLE)      PATHOLOGY REPORTS      IMAGING     ENDOSCOPY (IF AVAILABLE)     COLONOSCOPY (IF AVAILABLE)     ULTRASOUND LIVER     CT OF ABDOMEN     MRI OF LIVER     FIBROSCAN, US ELASTOGRAPHY, FIBROSIS SCAN, MR ELASTOGRAPHY Internal 06.30.2023 US Elastography   LABS     HEPATIC PANEL (LIVER PANEL)     BASIC METABOLIC PANEL Internal 02.04.2023   COMPLETE METABOLIC PANEL Internal 02.04.2023   COMPLETE BLOOD COUNT (CBC) Internal 02.04.2023   INTERNATIONAL NORMALIZED RATIO (INR)     HEPATITIS C ANTIBODY     HEPATITIS C VIRAL LOAD/PCR     HEPATITIS C GENOTYPE     HEPATITIS B SURFACE ANTIGEN     HEPATITIS B SURFACE ANTIBODY     HEPATITIS B DNA QUANT LEVEL     HEPATITIS B CORE ANTIBODY

## 2023-08-08 ENCOUNTER — PRE VISIT (OUTPATIENT)
Dept: GASTROENTEROLOGY | Facility: CLINIC | Age: 43
End: 2023-08-08
Payer: COMMERCIAL

## 2023-08-08 DIAGNOSIS — K76.0 FATTY LIVER: Primary | ICD-10-CM

## 2023-08-08 NOTE — TELEPHONE ENCOUNTER
Was the patient contacted by phone and reminded of the upcoming visit? Yes    Was the patient instructed to bring a current list of all medications to the appointment or instructed to bring in all medication bottles? Yes, patient verbalized understanding    Was the patient instructed to arrive prior to the appointment time to have ordered labs drawn? Yes, patient verbalized understanding    Were the needed lab orders placed? Yes    Was lab appointment made 1 hour or more prior to visit? Yes    Asked Tamer to fast after 12:30 for possible fibrosis scan, he verbally understood and agreed.     Bridgett Jaquez CMA  8/8/2023 11:57 AM

## 2023-08-14 ENCOUNTER — PRE VISIT (OUTPATIENT)
Dept: GASTROENTEROLOGY | Facility: CLINIC | Age: 43
End: 2023-08-14

## 2023-08-14 ENCOUNTER — OFFICE VISIT (OUTPATIENT)
Dept: GASTROENTEROLOGY | Facility: CLINIC | Age: 43
End: 2023-08-14
Attending: FAMILY MEDICINE
Payer: COMMERCIAL

## 2023-08-14 ENCOUNTER — TRANSFERRED RECORDS (OUTPATIENT)
Dept: HEALTH INFORMATION MANAGEMENT | Facility: CLINIC | Age: 43
End: 2023-08-14

## 2023-08-14 ENCOUNTER — LAB (OUTPATIENT)
Dept: LAB | Facility: CLINIC | Age: 43
End: 2023-08-14
Payer: COMMERCIAL

## 2023-08-14 VITALS
TEMPERATURE: 98 F | OXYGEN SATURATION: 98 % | HEART RATE: 68 BPM | DIASTOLIC BLOOD PRESSURE: 80 MMHG | BODY MASS INDEX: 31.54 KG/M2 | WEIGHT: 232.9 LBS | HEIGHT: 72 IN | RESPIRATION RATE: 18 BRPM | SYSTOLIC BLOOD PRESSURE: 121 MMHG

## 2023-08-14 DIAGNOSIS — K76.0 FATTY LIVER: ICD-10-CM

## 2023-08-14 DIAGNOSIS — E78.1 HYPERTRIGLYCERIDEMIA: Primary | ICD-10-CM

## 2023-08-14 LAB
ALBUMIN SERPL BCG-MCNC: 4.6 G/DL (ref 3.5–5.2)
ALP SERPL-CCNC: 64 U/L (ref 40–129)
ALT SERPL W P-5'-P-CCNC: 89 U/L (ref 0–70)
ANION GAP SERPL CALCULATED.3IONS-SCNC: 9 MMOL/L (ref 7–15)
AST SERPL W P-5'-P-CCNC: 43 U/L (ref 0–45)
BILIRUB DIRECT SERPL-MCNC: <0.2 MG/DL (ref 0–0.3)
BILIRUB SERPL-MCNC: 0.5 MG/DL
BUN SERPL-MCNC: 16.5 MG/DL (ref 6–20)
CALCIUM SERPL-MCNC: 9.5 MG/DL (ref 8.6–10)
CHLORIDE SERPL-SCNC: 104 MMOL/L (ref 98–107)
CHOLEST SERPL-MCNC: 208 MG/DL
CREAT SERPL-MCNC: 1.06 MG/DL (ref 0.67–1.17)
DEPRECATED HCO3 PLAS-SCNC: 26 MMOL/L (ref 22–29)
ERYTHROCYTE [DISTWIDTH] IN BLOOD BY AUTOMATED COUNT: 12.4 % (ref 10–15)
GFR SERPL CREATININE-BSD FRML MDRD: 90 ML/MIN/1.73M2
GLUCOSE SERPL-MCNC: 99 MG/DL (ref 70–99)
HBA1C MFR BLD: 5.5 %
HCT VFR BLD AUTO: 45.2 % (ref 40–53)
HDLC SERPL-MCNC: 37 MG/DL
HGB BLD-MCNC: 15.1 G/DL (ref 13.3–17.7)
INR PPP: 1.08 (ref 0.85–1.15)
LDLC SERPL CALC-MCNC: 126 MG/DL
MCH RBC QN AUTO: 28.5 PG (ref 26.5–33)
MCHC RBC AUTO-ENTMCNC: 33.4 G/DL (ref 31.5–36.5)
MCV RBC AUTO: 85 FL (ref 78–100)
NONHDLC SERPL-MCNC: 171 MG/DL
PLATELET # BLD AUTO: 230 10E3/UL (ref 150–450)
POTASSIUM SERPL-SCNC: 3.8 MMOL/L (ref 3.4–5.3)
PROT SERPL-MCNC: 7.2 G/DL (ref 6.4–8.3)
RBC # BLD AUTO: 5.29 10E6/UL (ref 4.4–5.9)
SODIUM SERPL-SCNC: 139 MMOL/L (ref 136–145)
TRIGL SERPL-MCNC: 226 MG/DL
WBC # BLD AUTO: 4.8 10E3/UL (ref 4–11)

## 2023-08-14 PROCEDURE — 85027 COMPLETE CBC AUTOMATED: CPT | Performed by: PATHOLOGY

## 2023-08-14 PROCEDURE — 99205 OFFICE O/P NEW HI 60 MIN: CPT | Mod: 25 | Performed by: PHYSICIAN ASSISTANT

## 2023-08-14 PROCEDURE — 82248 BILIRUBIN DIRECT: CPT | Performed by: PATHOLOGY

## 2023-08-14 PROCEDURE — 85610 PROTHROMBIN TIME: CPT | Performed by: PATHOLOGY

## 2023-08-14 PROCEDURE — 82784 ASSAY IGA/IGD/IGG/IGM EACH: CPT | Performed by: PHYSICIAN ASSISTANT

## 2023-08-14 PROCEDURE — 99000 SPECIMEN HANDLING OFFICE-LAB: CPT | Performed by: PATHOLOGY

## 2023-08-14 PROCEDURE — 80053 COMPREHEN METABOLIC PANEL: CPT | Performed by: PATHOLOGY

## 2023-08-14 PROCEDURE — 83036 HEMOGLOBIN GLYCOSYLATED A1C: CPT | Performed by: PHYSICIAN ASSISTANT

## 2023-08-14 PROCEDURE — G0463 HOSPITAL OUTPT CLINIC VISIT: HCPCS | Performed by: PHYSICIAN ASSISTANT

## 2023-08-14 PROCEDURE — 36415 COLL VENOUS BLD VENIPUNCTURE: CPT | Performed by: PATHOLOGY

## 2023-08-14 PROCEDURE — 80061 LIPID PANEL: CPT | Performed by: PATHOLOGY

## 2023-08-14 PROCEDURE — 91200 LIVER ELASTOGRAPHY: CPT | Mod: 26 | Performed by: PHYSICIAN ASSISTANT

## 2023-08-14 ASSESSMENT — PAIN SCALES - GENERAL: PAINLEVEL: NO PAIN (0)

## 2023-08-14 NOTE — NURSING NOTE
"Chief Complaint   Patient presents with    Consult     Elevated LFT's and abnormal liver imaging      Vital signs:  Temp: 98  F (36.7  C) Temp src: Oral BP: 121/80 Pulse: 68   Resp: 18 SpO2: 98 %     Height: 181.9 cm (5' 11.61\") Weight: 105.6 kg (232 lb 14.4 oz)  Estimated body mass index is 31.93 kg/m  as calculated from the following:    Height as of this encounter: 1.819 m (5' 11.61\").    Weight as of this encounter: 105.6 kg (232 lb 14.4 oz).      Bridgett Jaquez, [facility]  8/14/2023 2:45 PM    "

## 2023-08-14 NOTE — PROGRESS NOTES
Hepatology Clinic note  Isabel Argueta   Date of Birth 1980  Date of Service 8/14/2023    REASON FOR CONSULTATION: Fatty liver   REFERRING PROVIDER: Barry Garay MD          Assessment/plan:   Isabel Argueta is a 43 year old male with history of biopsy-proven hepatic steatosis, without steatohepatitis in 2008. Risk factors for fatty liver disease includes: obesity, dyslipidemia. Transaminases have been historically elevated. Liver function also normal without stigmata of advanced liver disease.  Given previous work-up and liver biopsy, do not think further evaluation is needed.    #Metabolic associated fatty liver disease (MALFD):   - Patient has a few risk factors making them on-going fibrosis. We discussed the pathophysiology and natural history of nonalcoholic fatty liver disease and cirrhosis.   - FibroScan today showing Stage 2, 8.7 kilopascals, which shows that he could potentially develop ongoing fibrosis without lifestyle modification and weight loss  - Recommend FIB-4 yearly with PCP. FIB-4 Calculation: 0.83 at 8/14/2023  2:29 PM as well as yearly screening for blood glucose and cholesterol or as needed  - Recommend slow gradual weight loss. Discussed how a weight loss of 3-5% can show improvement on steatosis and weight loss of 7-10% can show improvement on fibrosis on histology.   - Maintain good control of cholesterol (No contraindication to starting a statin with LFT elevations)   - Optimize blood glucose as needed. Could consider GLP-1 inhibitor for both insulin resistance and help with weight loss  - Improve nutrition: continue limiting carbohydrates, especially simple carbohydrates, and following Mediterranean eating patten  - Increase physical activity as able, ideally 150 minutes weekly  - Limit alcohol intake to not more than 3 drinks a week (He does not drink)     # IgA, TTG, HgA1c and lipid panel done today     Recommend follow up with non-invasive elastography or FibroScan in 3-5  years     - Follow up with Hepatology as needed or if signs of advanced liver disease       Kee Guillen PA-C   [facility] Hepatology     Total time for E/M services performed on the date of the encounter 60 minutes; excluding performing and official interpretation of fibrosis scan reads.  This included review of previous: clinic visits, hospital records, lab results, imaging studies, and procedural documentation. Time also includes patient visit, documentation and discussion with other providers.  The findings from this review are summarized in the above note.     -----------------------------------------------------       HPI:   Isabel Argueta is a 43 year old male  presenting for the evaluation of elevated LFT's.     LFT's were first noticed to be abnormal in 20 years ago (2007).  At [facility], he had extensive evaluation including a liver biopsy which showed hepatic steatosis without steatohepatitis.    Currently for breakfast he may have:Egg or cheese  Lungs: [place]  Dinner: prepared chicken / rice or steak.   Does snack in the evening time occasionally throughout the day.    For exercise, he does go for walks anywhere from 1 to 3 miles a day  Appetite is good. In mid-20's, gained weight 190-200. Slow gradual over the past 15 years.  Currently 230 lbs.     Patient denies jaundice, lower extremity edema, abdominal distension or confusion.    Patient also denies melena, hematochezia or hematemesis. Patient denies weight loss, fevers, sweats or chills.    PMH:    has a past medical history of Diverticulosis of large intestine (2013) and Palpitations. Obesity, dyslipidemia    SMH:    has a past surgical history that includes Chalazion Excision (Left, 2003); Eye surgery; Maidens Tooth Extraction; MASTECTOMY FOR GYNECOMASTIA (2018); liver biopsy; and Arthroscopy knee with medial meniscectomy (Left, 3/15/2023).     Medications:   cholecalciferol  FISH OIL  PO  fluticasone  magnesium  metFORMIN  MULTIVITAMIN ADULT PO  omeprazole  Restasis Emul     No previous tobacco use. No alcohol. No previous IV/IN drug use.  Work at the [employer]. [employer] .. Patient currently lives with wife, Jane. No known family history of liver disease or cancer.     Previous work-up:   Lab Results   Component Value Date    IRONSAT 18 06/02/2022    TSH 2.21 06/02/2022    CHOL 224 (H) 05/07/2021    HDL 25 (L) 05/07/2021    LDL  05/07/2021     Cannot estimate LDL when triglyceride exceeds 400 mg/dL    TRIG 494 (H) 05/07/2021    A1C 5.4 06/02/2022      No results found for: SPECDES, LDRESULTS   AMY negative  ASMA -negative  AMA negative  A1AT: MM  Ceruloplasmin 31/20    Recent Labs   Lab Test 08/14/23  1429 02/22/23  1553 05/07/21  1001 03/11/21  1552   ALKPHOS 64 71 59 63   ALT 89* 128* 116* 112*   AST 43 49 36 37             Allergies:     Allergies   Allergen Reactions    Dust Mites             Social History:     Social History     Socioeconomic History    Marital status:      Spouse name: Not on file    Number of children: Not on file    Years of education: Not on file    Highest education level: Not on file   Occupational History    Not on file   Tobacco Use    Smoking status: Never    Smokeless tobacco: Never   Vaping Use    Vaping Use: Never used   Substance and Sexual Activity    Alcohol use: Never    Drug use: Never    Sexual activity: Yes     Partners: Female     Birth control/protection: I.U.D.     Comment: ; monagamous   Other Topics Concern    Not on file   Social History Narrative    Name is pronounced like Jemma.        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     Social Determinants of Health     Financial Resource Strain: Not on file   Food Insecurity: Not on file   Transportation Needs: Not on file   Physical Activity: Not on file   Stress: Not on file   Social Connections: Not on file   Intimate Partner Violence: Not on file   Housing Stability:  "Not on file            Family History:     Family History   Problem Relation Age of Onset    Diabetes Mother     Thyroid Cancer Mother     Hypertension Father     Colon Polyps Father     Asthma Brother     No Known Problems Brother     Glaucoma Brother     No Known Problems Maternal Grandmother     No Known Problems Maternal Grandfather     No Known Problems Paternal Grandmother     No Known Problems Paternal Grandfather     Diabetes Type 2  Daughter     No Known Problems Daughter     No Known Problems Daughter     No Known Problems Daughter     No Known Problems Daughter     No Known Problems Son     No Known Problems Son     No Known Problems Son             Review of Systems:   Gen: See HPI     HEENT: No change in vision or hearing, mouth sores, dysphagia, lymph nodes  Resp: No shortness of breath, coughing, hx of asthma  CV: No chest pain, palpitations, syncope   GI: See HPI  : No dysuria, history of stones, urine color    Skin: No rash; no pruritus or psoriasis  MS: No arthralgias, myalgias, joint swelling  Neuro: No memory changes, confusion, numbness    Heme: No difficulty clotting, bruising, bleeding  Psych:  No anxiety, depression, agitation          Physical Exam:   VS:  /80 (BP Location: Right arm, Patient Position: Sitting, Cuff Size: Adult Large)   Pulse 68   Temp 98  F (36.7  C) (Oral)   Resp 18   Ht 1.819 m (5' 11.61\")   Wt 105.6 kg (232 lb 14.4 oz)   SpO2 98%   BMI 31.93 kg/m        Gen: A&Ox3, NAD, well developed  HEENT: non-icteric   CV: RRR, no overt murmurs  Lung: CTA Bilatererally, no wheezing or crackles.   Lym- no palpable lymphadenopathy  Abd: soft, NT, ND, no palpable splenomegaly, liver is not palpable.   Ext: no edema, intact pulses.   Skin: No rash, no palmar erythema, telangiectasias or jaundice  Neuro: grossly intact, no asterixis   Psych: appropriate mood and affects         Data:   Reviewed in person and significant for:    Lab Results   Component Value Date     " 08/14/2023     05/07/2021      Lab Results   Component Value Date    POTASSIUM 3.8 08/14/2023    POTASSIUM 3.8 05/07/2021     Lab Results   Component Value Date    CHLORIDE 104 08/14/2023    CHLORIDE 105 05/07/2021     Lab Results   Component Value Date    CO2 26 08/14/2023    CO2 26 05/07/2021     Lab Results   Component Value Date    BUN 16.5 08/14/2023    BUN 18 05/07/2021     Lab Results   Component Value Date    CR 1.06 08/14/2023    CR 0.99 05/07/2021       Lab Results   Component Value Date    WBC 4.8 08/14/2023     Lab Results   Component Value Date    HGB 15.1 08/14/2023     Lab Results   Component Value Date    HCT 45.2 08/14/2023     Lab Results   Component Value Date    MCV 85 08/14/2023     Lab Results   Component Value Date     08/14/2023       Lab Results   Component Value Date    AST 43 08/14/2023    AST 36 05/07/2021     Lab Results   Component Value Date    ALT 89 08/14/2023     05/07/2021     No results found for: BILICONJ   Lab Results   Component Value Date    BILITOTAL 0.5 08/14/2023    BILITOTAL 0.5 05/07/2021       Lab Results   Component Value Date    ALBUMIN 4.6 08/14/2023    ALBUMIN 4.0 05/07/2021     Lab Results   Component Value Date    PROTTOTAL 7.2 08/14/2023    PROTTOTAL 7.6 05/07/2021      Lab Results   Component Value Date    ALKPHOS 64 08/14/2023    ALKPHOS 59 05/07/2021       Lab Results   Component Value Date    INR 1.08 08/14/2023         Imaging:        (NOTE)  Surgical Final Report  Patient Name: VIRY TESFAYE  Accession #:[id number]  Taken: 4/2/2008  Received: 4/2/2008  Reported: 4/9/2008  Physician(s): NITO GLEZ ([id number])  ANGELO HUNTLEY ([id number])                    Final Pathologic Diagnosis  Liver, random biopsy --      1.  Moderate steatosis      2.  No evidence of active inflammation or steatohepatitis      3.  No evidence of fibrosis on trichrome stain      4.  No stainable iron seen on iron stain      5.  See below for Copper study results           jds/4/2/2008         EXAMINATION: Limited Abdominal Ultrasound, 6/30/2023 8:02 AM      COMPARISON: None available     History: Fatty liver.      FINDINGS:  Pancreas: Obscured.     Liver: The liver measures 17.0 cm and demonstrates diffusely echogenic  parenchyma. No evidence of a focal hepatic mass. The main portal vein  is patent with antegrade flow.     Gallbladder: There is no wall thickening, pericholecystic fluid,  positive sonographic Balderrama's sign or evidence for cholelithiasis.     Bile Ducts: Both the intra- and extrahepatic biliary system are of  normal caliber.  The common bile duct measures 3 mm in diameter.     Right Kidney: Measures 11.3 cm in length with normal parenchymal  echogenicity and cortical thickness. No hydronephrosis.     Fluid: None in the visualized abdomen.     Ultrasound elastography of the liver was performed using a Mederos  ultrasound machine using 10 separate measurements of the liver  parenchyma with patient in supine position. Measurements were obtained  approximately 2 cm beneath Brielle's capsule, perpendicular to the  liver capsule. Images are of satisfactory quality.     The median liver stiffness is: 1.73 m/sec  The mean liver stiffness is: 1.66 m/sec  The interquartile range is: 0.42  IQR/median: 0.24.  (IQR/median value of greater than 0.15 indicates  that a dataset is unreliable)                                                                      IMPRESSION:   1. Hepatic steatosis.  2. The median liver stiffness is 1.73 m/sec and the IQR/median value  is 0.24 . This is a high elastography value suggestive of advanced  chronic liver disease, however, this is an unreliable data set due to  the inhomogeneity of the values. Consider MRI elastography.

## 2023-08-14 NOTE — LETTER
8/14/2023         RE: Isabel Argueta  [address]  [facility] [address]        Dear Colleague,    Thank you for referring your patient, Isabel Argueta, to the [facility] [place]. Please see a copy of my visit note below.    Hepatology Clinic note  Isabel Argueta   Date of Birth 1980  Date of Service 8/14/2023    REASON FOR CONSULTATION: Fatty liver   REFERRING PROVIDER: Barry Garay MD          Assessment/plan:   Isabel Argueta is a 43 year old male with history of biopsy-proven hepatic steatosis, without steatohepatitis in 2008. Risk factors for fatty liver disease includes: obesity, dyslipidemia. Transaminases have been historically elevated. Liver function also normal without stigmata of advanced liver disease.  Given previous work-up and liver biopsy, do not think further evaluation is needed.    #Metabolic associated fatty liver disease (MALFD):   - Patient has a few risk factors making them on-going fibrosis. We discussed the pathophysiology and natural history of nonalcoholic fatty liver disease and cirrhosis.   - FibroScan today showing Stage 2, 8.7 kilopascals, which shows that he could potentially develop ongoing fibrosis without lifestyle modification and weight loss  - Recommend FIB-4 yearly with PCP. FIB-4 Calculation: 0.83 at 8/14/2023  2:29 PM as well as yearly screening for blood glucose and cholesterol or as needed  - Recommend slow gradual weight loss. Discussed how a weight loss of 3-5% can show improvement on steatosis and weight loss of 7-10% can show improvement on fibrosis on histology.   - Maintain good control of cholesterol (No contraindication to starting a statin with LFT elevations)   - Optimize blood glucose as needed. Could consider GLP-1 inhibitor for both insulin resistance and help with weight loss  - Improve nutrition: continue limiting carbohydrates, especially simple carbohydrates, and following Mediterranean  eating patten  - Increase physical activity as able, ideally 150 minutes weekly  - Limit alcohol intake to not more than 3 drinks a week (He does not drink)     # IgA, TTG, HgA1c and lipid panel done today     Recommend follow up with non-invasive elastography or FibroScan in 3-5 years     - Follow up with Hepatology as needed or if signs of advanced liver disease       Kee Guillen PA-C   [facility] Hepatology     Total time for E/M services performed on the date of the encounter 60 minutes; excluding performing and official interpretation of fibrosis scan reads.  This included review of previous: clinic visits, hospital records, lab results, imaging studies, and procedural documentation. Time also includes patient visit, documentation and discussion with other providers.  The findings from this review are summarized in the above note.     -----------------------------------------------------       HPI:   Isabel Argueta is a 43 year old male  presenting for the evaluation of elevated LFT's.     LFT's were first noticed to be abnormal in 20 years ago (2007).  At [facility], he had extensive evaluation including a liver biopsy which showed hepatic steatosis without steatohepatitis.    Currently for breakfast he may have:Egg or cheese  Lungs: [place]  Dinner: prepared chicken / rice or steak.   Does snack in the evening time occasionally throughout the day.    For exercise, he does go for walks anywhere from 1 to 3 miles a day  Appetite is good. In mid-20's, gained weight 190-200. Slow gradual over the past 15 years.  Currently 230 lbs.     Patient denies jaundice, lower extremity edema, abdominal distension or confusion.    Patient also denies melena, hematochezia or hematemesis. Patient denies weight loss, fevers, sweats or chills.    PMH:    has a past medical history of Diverticulosis of large intestine (2013) and Palpitations. Obesity, dyslipidemia    SMH:    has a past surgical history that  includes Chalazion Excision (Left, 2003); Eye surgery; [place] Tooth Extraction; MASTECTOMY FOR GYNECOMASTIA (2018); liver biopsy; and Arthroscopy knee with medial meniscectomy (Left, 3/15/2023).     Medications:   cholecalciferol  FISH OIL PO  fluticasone  magnesium  metFORMIN  MULTIVITAMIN ADULT PO  omeprazole  Restasis Emul     No previous tobacco use. No alcohol. No previous IV/IN drug use.  Work at the [employer] .. Patient currently lives with wife, Jane. No known family history of liver disease or cancer.     Previous work-up:   Lab Results   Component Value Date    IRONSAT 18 06/02/2022    TSH 2.21 06/02/2022    CHOL 224 (H) 05/07/2021    HDL 25 (L) 05/07/2021    LDL  05/07/2021     Cannot estimate LDL when triglyceride exceeds 400 mg/dL    TRIG 494 (H) 05/07/2021    A1C 5.4 06/02/2022      No results found for: SPECDES, LDRESULTS   AMY negative  ASMA -negative  AMA negative  A1AT: MM  Ceruloplasmin 31/20    Recent Labs   Lab Test 08/14/23  1429 02/22/23  1553 05/07/21  1001 03/11/21  1552   ALKPHOS 64 71 59 63   ALT 89* 128* 116* 112*   AST 43 49 36 37             Allergies:     Allergies   Allergen Reactions    Dust Mites             Social History:     Social History     Socioeconomic History    Marital status:      Spouse name: Not on file    Number of children: Not on file    Years of education: Not on file    Highest education level: Not on file   Occupational History    Not on file   Tobacco Use    Smoking status: Never    Smokeless tobacco: Never   Vaping Use    Vaping Use: Never used   Substance and Sexual Activity    Alcohol use: Never    Drug use: Never    Sexual activity: Yes     Partners: Female     Birth control/protection: I.U.D.     Comment: ; monagamous   Other Topics Concern    Not on file   Social History Narrative    Name is pronounced like Jemma.        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     Social Determinants of Health     Financial  "Resource Strain: Not on file   Food Insecurity: Not on file   Transportation Needs: Not on file   Physical Activity: Not on file   Stress: Not on file   Social Connections: Not on file   Intimate Partner Violence: Not on file   Housing Stability: Not on file            Family History:     Family History   Problem Relation Age of Onset    Diabetes Mother     Thyroid Cancer Mother     Hypertension Father     Colon Polyps Father     Asthma Brother     No Known Problems Brother     Glaucoma Brother     No Known Problems Maternal Grandmother     No Known Problems Maternal Grandfather     No Known Problems Paternal Grandmother     No Known Problems Paternal Grandfather     Diabetes Type 2  Daughter     No Known Problems Daughter     No Known Problems Daughter     No Known Problems Daughter     No Known Problems Daughter     No Known Problems Son     No Known Problems Son     No Known Problems Son             Review of Systems:   Gen: See HPI     HEENT: No change in vision or hearing, mouth sores, dysphagia, lymph nodes  Resp: No shortness of breath, coughing, hx of asthma  CV: No chest pain, palpitations, syncope   GI: See HPI  : No dysuria, history of stones, urine color    Skin: No rash; no pruritus or psoriasis  MS: No arthralgias, myalgias, joint swelling  Neuro: No memory changes, confusion, numbness    Heme: No difficulty clotting, bruising, bleeding  Psych:  No anxiety, depression, agitation          Physical Exam:   VS:  /80 (BP Location: Right arm, Patient Position: Sitting, Cuff Size: Adult Large)   Pulse 68   Temp 98  F (36.7  C) (Oral)   Resp 18   Ht 1.819 m (5' 11.61\")   Wt 105.6 kg (232 lb 14.4 oz)   SpO2 98%   BMI 31.93 kg/m        Gen: A&Ox3, NAD, well developed  HEENT: non-icteric   CV: RRR, no overt murmurs  Lung: CTA Bilatererally, no wheezing or crackles.   Lym- no palpable lymphadenopathy  Abd: soft, NT, ND, no palpable splenomegaly, liver is not palpable.   Ext: no edema, intact " pulses.   Skin: No rash, no palmar erythema, telangiectasias or jaundice  Neuro: grossly intact, no asterixis   Psych: appropriate mood and affects         Data:   Reviewed in person and significant for:    Lab Results   Component Value Date     08/14/2023     05/07/2021      Lab Results   Component Value Date    POTASSIUM 3.8 08/14/2023    POTASSIUM 3.8 05/07/2021     Lab Results   Component Value Date    CHLORIDE 104 08/14/2023    CHLORIDE 105 05/07/2021     Lab Results   Component Value Date    CO2 26 08/14/2023    CO2 26 05/07/2021     Lab Results   Component Value Date    BUN 16.5 08/14/2023    BUN 18 05/07/2021     Lab Results   Component Value Date    CR 1.06 08/14/2023    CR 0.99 05/07/2021       Lab Results   Component Value Date    WBC 4.8 08/14/2023     Lab Results   Component Value Date    HGB 15.1 08/14/2023     Lab Results   Component Value Date    HCT 45.2 08/14/2023     Lab Results   Component Value Date    MCV 85 08/14/2023     Lab Results   Component Value Date     08/14/2023       Lab Results   Component Value Date    AST 43 08/14/2023    AST 36 05/07/2021     Lab Results   Component Value Date    ALT 89 08/14/2023     05/07/2021     No results found for: BILICONJ   Lab Results   Component Value Date    BILITOTAL 0.5 08/14/2023    BILITOTAL 0.5 05/07/2021       Lab Results   Component Value Date    ALBUMIN 4.6 08/14/2023    ALBUMIN 4.0 05/07/2021     Lab Results   Component Value Date    PROTTOTAL 7.2 08/14/2023    PROTTOTAL 7.6 05/07/2021      Lab Results   Component Value Date    ALKPHOS 64 08/14/2023    ALKPHOS 59 05/07/2021       Lab Results   Component Value Date    INR 1.08 08/14/2023         Imaging:        (NOTE)  Surgical Final Report  Patient Name: VIRY TESFAYE  Accession #:[id number]  Taken: 4/2/2008  Received: 4/2/2008  Reported: 4/9/2008  Physician(s): NITO GLEZ ([id number])  ANGELO HUNTLEY ([id number])                    Final Pathologic Diagnosis  Liver, random  biopsy --      1.  Moderate steatosis      2.  No evidence of active inflammation or steatohepatitis      3.  No evidence of fibrosis on trichrome stain      4.  No stainable iron seen on iron stain      5.  See below for Copper study results          jds/4/2/2008         EXAMINATION: Limited Abdominal Ultrasound, 6/30/2023 8:02 AM      COMPARISON: None available     History: Fatty liver.      FINDINGS:  Pancreas: Obscured.     Liver: The liver measures 17.0 cm and demonstrates diffusely echogenic  parenchyma. No evidence of a focal hepatic mass. The main portal vein  is patent with antegrade flow.     Gallbladder: There is no wall thickening, pericholecystic fluid,  positive sonographic Balderrama's sign or evidence for cholelithiasis.     Bile Ducts: Both the intra- and extrahepatic biliary system are of  normal caliber.  The common bile duct measures 3 mm in diameter.     Right Kidney: Measures 11.3 cm in length with normal parenchymal  echogenicity and cortical thickness. No hydronephrosis.     Fluid: None in the visualized abdomen.     Ultrasound elastography of the liver was performed using a Mederos  ultrasound machine using 10 separate measurements of the liver  parenchyma with patient in supine position. Measurements were obtained  approximately 2 cm beneath Brielle's capsule, perpendicular to the  liver capsule. Images are of satisfactory quality.     The median liver stiffness is: 1.73 m/sec  The mean liver stiffness is: 1.66 m/sec  The interquartile range is: 0.42  IQR/median: 0.24.  (IQR/median value of greater than 0.15 indicates  that a dataset is unreliable)                                                                      IMPRESSION:   1. Hepatic steatosis.  2. The median liver stiffness is 1.73 m/sec and the IQR/median value  is 0.24 . This is a high elastography value suggestive of advanced  chronic liver disease, however, this is an unreliable data set due to  the inhomogeneity of the values.  Consider MRI elastography.         Sincerely,        Kee Guillen PA-C

## 2023-08-15 ENCOUNTER — MYC MEDICAL ADVICE (OUTPATIENT)
Dept: FAMILY MEDICINE | Facility: CLINIC | Age: 43
End: 2023-08-15
Payer: COMMERCIAL

## 2023-08-15 DIAGNOSIS — K21.9 GASTROESOPHAGEAL REFLUX DISEASE WITHOUT ESOPHAGITIS: ICD-10-CM

## 2023-08-15 LAB — IGA SERPL-MCNC: 197 MG/DL (ref 84–499)

## 2023-08-15 RX ORDER — OMEPRAZOLE 20 MG/1
20 TABLET, DELAYED RELEASE ORAL DAILY
Qty: 30 TABLET | Refills: 5 | Status: SHIPPED | OUTPATIENT
Start: 2023-08-15 | End: 2023-10-05

## 2023-08-15 NOTE — TELEPHONE ENCOUNTER
"Requested Prescriptions   Pending Prescriptions Disp Refills    omeprazole (PRILOSEC OTC) 20 MG EC tablet 30 tablet 5     Sig: Take 1 tablet (20 mg) by mouth daily       PPI Protocol Passed - 8/15/2023  2:02 PM        Passed - Not on Clopidogrel (unless Pantoprazole ordered)        Passed - No diagnosis of osteoporosis on record        Passed - Recent (12 mo) or future (30 days) visit within the authorizing provider's specialty     Patient has had an office visit with the authorizing provider or a provider within the authorizing providers department within the previous 12 mos or has a future within next 30 days. See \"Patient Info\" tab in inbasket, or \"Choose Columns\" in Meds & Orders section of the refill encounter.              Passed - Medication is active on med list        Passed - Patient is age 18 or older            Prescription approved per [facility] Refill Protocol.     Katy Pablo RN  [facility]      "

## 2023-08-15 NOTE — TELEPHONE ENCOUNTER
Pt sent My chart requesting refill.    Pt was last seen on 6/15/23    Katy Pablo RN  [facility]

## 2023-08-18 ENCOUNTER — VIRTUAL VISIT (OUTPATIENT)
Dept: ONCOLOGY | Facility: CLINIC | Age: 43
End: 2023-08-18
Attending: DIETITIAN, REGISTERED
Payer: COMMERCIAL

## 2023-08-18 DIAGNOSIS — E78.1 HYPERTRIGLYCERIDEMIA: Primary | ICD-10-CM

## 2023-08-18 PROCEDURE — 97802 MEDICAL NUTRITION INDIV IN: CPT | Mod: TEL,95 | Performed by: DIETITIAN, REGISTERED

## 2023-08-18 NOTE — LETTER
"    8/18/2023         RE: Isabel Argueta  [address]  [facility] [address]        Dear Colleague,    Thank you for referring your patient, Isabel Argueta, to the [facility] CANC[facility]. Please see a copy of my visit note below.    The patient has been notified of the following:      \"We have found that certain health care needs can be provided without the need for a face to face visit.  This service lets us provide the care you need with a phone conversation.       I will have full access to your [place] medical record during this entire phone call.   I will be taking notes for your medical record.      Since this is like an office visit, we will bill your insurance company for this service.       There are potential benefits and risks of telephone visits (e.g. limits to patient confidentiality) that differ from in-person visits.?  Confidentiality still applies for telephone services, and nobody will record the visit.  It is important to be in a quiet, private space that is free of distractions (including cell phone or other devices) during the visit.??      If during the course of the call I believe a telephone visit is not appropriate, you will not be charged for this service\"     Consent has been obtained for this service by care team member: Yes       CLINICAL NUTRITION SERVICES - ASSESSMENT NOTE    Isabel Argueta 43 year old referred for MNT related to Obesity    Time Spent: 30 minutes  Visit Type: phone  Referring Physician:  7/5/23    NUTRITION HISTORY  Current diet: general   Activity: 1 - 1  1/2 miles of walking daily; [place] at work, on his feet all day    Isabel presents today to obtain guidance on ways to lose weight and improve cholesterol.    He is frustrated with lack of ability to lose weight despite eating quite healthy.    He has recently lose several pounds by being more cognizant of what he's eating and has also been walking more.   He is on his " "feet most of the day walking from unit to unit as a [employer] [place] [facility] [place]      Diet Recall  Day 1:  Breakfast - espresso w/ milk (8oz), 2 string cheese  Lunch - chiptole chicken bowl w/ brown rice, black beans, chicken, tomatoes, corn, lettuce, hot sauce, cheese  Dinner - espresso w/ milk (8oz), 2 eggs w/ sour dough (small slices of bread), jalapeno onion oil, pesto  Snacks - 2 string cheese, yogurt w/rasp, watermelon seeds  Beverages - liquid death and bottled water    Day 2:   Breakfast - espresso w/ milk (8oz), PBJ protein bites (4-5)  Lunch - chiptole chicken bowl w/ brown rice, black beans, chicken, tomatoes, corn, lettuce, hot sauce, cheese, liquid death sparking water  Dinner - chicken lettuce wraps, corn on the cob   Snacks - Cukes, peppers, tomatoes, watermelon seeds  Beverages - liquid death and bottled water    Day 3:  Breakfast - espresso w/ milk (8oz), 2 string cheese  Lunch - chiptole chicken bowl w/ brown rice, black beans, chicken, tomatoes, corn, lettuce, hot sauce, cheese, liquid death  Dinner - espresso w/ milk (8oz), corn on the cob 2 eggs w/ sour dough (small slices of bread), jalopeno onion oil, pesto  Snacks - blueberries and yogurt watermelon seeds  Beverages - liquid death and bottled water    ANTHROPOMETRICS  Height: 5'11\"  Weight: 232 lbs  BMI: 31  Weight Status:  Obesity Grade I BMI 30-34.9  Weight History:   Wt Readings from Last 10 Encounters:   08/14/23 105.6 kg (232 lb 14.4 oz)   06/21/23 103.4 kg (228 lb)   06/15/23 103.2 kg (227 lb 8 oz)   05/01/23 104.3 kg (230 lb)   03/27/23 104.3 kg (230 lb)   03/15/23 104.3 kg (230 lb)   02/22/23 107.5 kg (237 lb)   02/13/23 108 kg (238 lb)   01/26/23 108 kg (238 lb)   01/19/23 107.7 kg (237 lb 8 oz)     Dosing Weight: 85kg    Medications/vitamins/minerals/herbals:   Reviewed    Labs:  Reviewed 8/14/23  Chol - 208  Trig - 226    ASSESSED NUTRITION NEEDS:  Estimated Energy Needs: 7709-0782 kcals (20-25 Kcal/Kg)  Justification: " "overweight  Estimated Protein Needs: 85 grams protein (0.8-1 g pro/Kg)  Justification: maintenance    NUTRITION DIAGNOSIS:  Overweight related to self-monitoring deficit, excessive caloric intake AEB diet recall BMI >30    INTERVENTIONS  Provided written & verbal education:   --Discussed dietary and behavioral modifications to promote weight loss (portion control, meal consistency, measuring foods, 9\"portion plate method, fiber sources, protein sources, eating pace, exercise and avoidance of disordered eating patterns.    --Reviewed calorie goals for desired wt loss. Encouraged to limit calories to < 2100 daily.  Emphasized importance of tracking daily intake for accountability and to ensure adequacy.   --Reviewed fiber content of foods and role it plays in BG control, appetite control etc.  Encouraged to aim for at least 25 grams/day. Encouraged to choose grains, breads etc with at least 3g fiber per serving.   --Reviewed plant and animal based proteins. Encouraged to aim for at least 70g protein/day, choosing plant based, seafood/fish, poultry and less red meat as able. Encouraged to cut back on dairy, choosing <3 servings verus ~5 servings per day.   --Reviewed exercise (aerobic and strength training). Encouraged to engage in both exercises to help reduce lipids and facilitate weight loss.       Provided pt with corresponding education materials/handouts on:  --Sources of protein  --Fiber content of common foods  --1800 calorie meal plan    Pt verbalize understanding of materials provided during consult.   Patient Understanding: Excellent  Expected Compliance: Excellent     Goals  --Calories: 1700-2100day  --Protein: 70-85 grams/day  --Fiber: 25 grams/day  --Fluids: 8-10 cups water/day  --Activity: aim for at 150 min of cardio and 75 min of strength training as able       Follow-Up Plans: Pt has RD contact information for questions.      Mary Spencer RD, , LD    "

## 2023-08-18 NOTE — PATIENT INSTRUCTIONS
Branden Hall,     I have attached the resources that I referred to during our conversation (see your email):  --Sources of protein  --Fiber content of common foods  --1800 calorie meal plan    Here are your nutrition goals:  --Calories: 1700-2100day  --Protein: 70-85 grams/day  --Fiber: 25 grams/day  --Fluids: 8-10 cups water/day  --Activity: aim for at 150 min of cardio and 75 min of strength training as able     Please reach out to me with any questions along the way!    Be well,     Mary Spencer RD, , LD  [facility]  Outpatient Services  [email or web address]   Office: [phone]  Fax: [phone]

## 2023-08-18 NOTE — PROGRESS NOTES
"The patient has been notified of the following:      \"We have found that certain health care needs can be provided without the need for a face to face visit.  This service lets us provide the care you need with a phone conversation.       I will have full access to your [facility] medical record during this entire phone call.   I will be taking notes for your medical record.      Since this is like an office visit, we will bill your insurance company for this service.       There are potential benefits and risks of telephone visits (e.g. limits to patient confidentiality) that differ from in-person visits.?  Confidentiality still applies for telephone services, and nobody will record the visit.  It is important to be in a quiet, private space that is free of distractions (including cell phone or other devices) during the visit.??      If during the course of the call I believe a telephone visit is not appropriate, you will not be charged for this service\"     Consent has been obtained for this service by care team member: Yes       CLINICAL NUTRITION SERVICES - ASSESSMENT NOTE    Isabel LORENZANA Kendall 43 year old referred for MNT related to Obesity    Time Spent: 30 minutes  Visit Type: phone  Referring Physician:  7/5/23    NUTRITION HISTORY  Current diet: general   Activity: 1 - 1  1/2 miles of walking daily; Marian at work, on his feet all day    Isabel presents today to obtain guidance on ways to lose weight and improve cholesterol.    He is frustrated with lack of ability to lose weight despite eating quite healthy.    He has recently lose several pounds by being more cognizant of what he's eating and has also been walking more.   He is on his feet most of the day walking from unit to unit as a [employer] on [facility].      Diet Recall  Day 1:  Breakfast - espresso w/ milk (8oz), 2 string cheese  Lunch - chiptole chicken bowl w/ brown rice, black beans, chicken, tomatoes, corn, lettuce, hot sauce, cheese  Dinner " "- espresso w/ milk (8oz), 2 eggs w/ sour dough (small slices of bread), jalapeno onion oil, pesto  Snacks - 2 string cheese, yogurt w/rasp, watermelon seeds  Beverages - liquid death and bottled water    Day 2:   Breakfast - espresso w/ milk (8oz), PBJ protein bites (4-5)  Lunch - chiptole chicken bowl w/ brown rice, black beans, chicken, tomatoes, corn, lettuce, hot sauce, cheese, liquid death sparking water  Dinner - chicken lettuce wraps, corn on the cob   Snacks - Cukes, peppers, tomatoes, watermelon seeds  Beverages - liquid death and bottled water    Day 3:  Breakfast - espresso w/ milk (8oz), 2 string cheese  Lunch - chiptole chicken bowl w/ brown rice, black beans, chicken, tomatoes, corn, lettuce, hot sauce, cheese, liquid death  Dinner - espresso w/ milk (8oz), corn on the cob 2 eggs w/ sour dough (small slices of bread), jalopeno onion oil, pesto  Snacks - blueberries and yogurt watermelon seeds  Beverages - liquid death and bottled water    ANTHROPOMETRICS  Height: 5'11\"  Weight: 232 lbs  BMI: 31  Weight Status:  Obesity Grade I BMI 30-34.9  Weight History:   Wt Readings from Last 10 Encounters:   08/14/23 105.6 kg (232 lb 14.4 oz)   06/21/23 103.4 kg (228 lb)   06/15/23 103.2 kg (227 lb 8 oz)   05/01/23 104.3 kg (230 lb)   03/27/23 104.3 kg (230 lb)   03/15/23 104.3 kg (230 lb)   02/22/23 107.5 kg (237 lb)   02/13/23 108 kg (238 lb)   01/26/23 108 kg (238 lb)   01/19/23 107.7 kg (237 lb 8 oz)     Dosing Weight: 85kg    Medications/vitamins/minerals/herbals:   Reviewed    Labs:  Reviewed 8/14/23  Chol - 208  Trig - 226    ASSESSED NUTRITION NEEDS:  Estimated Energy Needs: 4791-6830 kcals (20-25 Kcal/Kg)  Justification: overweight  Estimated Protein Needs: 85 grams protein (0.8-1 g pro/Kg)  Justification: maintenance    NUTRITION DIAGNOSIS:  Overweight related to self-monitoring deficit, excessive caloric intake AEB diet recall BMI >30    INTERVENTIONS  Provided written & verbal education:   --Discussed " "dietary and behavioral modifications to promote weight loss (portion control, meal consistency, measuring foods, 9\"portion plate method, fiber sources, protein sources, eating pace, exercise and avoidance of disordered eating patterns.    --Reviewed calorie goals for desired wt loss. Encouraged to limit calories to < 2100 daily.  Emphasized importance of tracking daily intake for accountability and to ensure adequacy.   --Reviewed fiber content of foods and role it plays in BG control, appetite control etc.  Encouraged to aim for at least 25 grams/day. Encouraged to choose grains, breads etc with at least 3g fiber per serving.   --Reviewed plant and animal based proteins. Encouraged to aim for at least 70g protein/day, choosing plant based, seafood/fish, poultry and less red meat as able. Encouraged to cut back on dairy, choosing <3 servings verus ~5 servings per day.   --Reviewed exercise (aerobic and strength training). Encouraged to engage in both exercises to help reduce lipids and facilitate weight loss.       Provided pt with corresponding education materials/handouts on:  --Sources of protein  --Fiber content of common foods  --1800 calorie meal plan    Pt verbalize understanding of materials provided during consult.   Patient Understanding: Excellent  Expected Compliance: Excellent     Goals  --Calories: 1700-2100day  --Protein: 70-85 grams/day  --Fiber: 25 grams/day  --Fluids: 8-10 cups water/day  --Activity: aim for at 150 min of cardio and 75 min of strength training as able       Follow-Up Plans: Pt has RD contact information for questions.      Mary Spencer RD, , LD    "

## 2023-09-27 ENCOUNTER — MYC MEDICAL ADVICE (OUTPATIENT)
Dept: FAMILY MEDICINE | Facility: CLINIC | Age: 43
End: 2023-09-27
Payer: COMMERCIAL

## 2023-09-27 NOTE — TELEPHONE ENCOUNTER
Per pt gina got flu shot on 9/25/23 and requesting chart to be updated.    Nishant Griffin RN   [facility]

## 2023-09-29 ENCOUNTER — IMMUNIZATION (OUTPATIENT)
Dept: NURSING | Facility: CLINIC | Age: 43
End: 2023-09-29
Payer: COMMERCIAL

## 2023-09-29 PROCEDURE — 91320 SARSCV2 VAC 30MCG TRS-SUC IM: CPT

## 2023-09-29 PROCEDURE — 90480 ADMN SARSCOV2 VAC 1/ONLY CMP: CPT

## 2023-10-01 ENCOUNTER — HEALTH MAINTENANCE LETTER (OUTPATIENT)
Age: 43
End: 2023-10-01

## 2023-10-05 ENCOUNTER — OFFICE VISIT (OUTPATIENT)
Dept: FAMILY MEDICINE | Facility: CLINIC | Age: 43
End: 2023-10-05
Attending: FAMILY MEDICINE
Payer: COMMERCIAL

## 2023-10-05 VITALS
SYSTOLIC BLOOD PRESSURE: 130 MMHG | OXYGEN SATURATION: 99 % | HEART RATE: 81 BPM | TEMPERATURE: 98.1 F | HEIGHT: 72 IN | BODY MASS INDEX: 31.07 KG/M2 | RESPIRATION RATE: 15 BRPM | WEIGHT: 229.4 LBS | DIASTOLIC BLOOD PRESSURE: 80 MMHG

## 2023-10-05 DIAGNOSIS — K21.9 GASTROESOPHAGEAL REFLUX DISEASE WITHOUT ESOPHAGITIS: Primary | ICD-10-CM

## 2023-10-05 DIAGNOSIS — K76.0 FATTY LIVER: ICD-10-CM

## 2023-10-05 PROCEDURE — 99213 OFFICE O/P EST LOW 20 MIN: CPT | Performed by: FAMILY MEDICINE

## 2023-10-05 RX ORDER — FAMOTIDINE 20 MG/1
20 TABLET, FILM COATED ORAL EVERY EVENING
Qty: 30 TABLET | Refills: 2 | Status: SHIPPED | OUTPATIENT
Start: 2023-10-05 | End: 2023-11-21

## 2023-10-05 ASSESSMENT — PAIN SCALES - GENERAL: PAINLEVEL: NO PAIN (0)

## 2023-10-05 NOTE — PROGRESS NOTES
"  {PROVIDER CHARTING PREFERENCE:426535}    Subjective   Tamer is a 43 year old, presenting for the following health issues:  Follow Up        10/5/2023     3:48 PM   Additional Questions   Roomed by Shelley Roth         10/5/2023     3:49 PM   Patient Reported Additional Medications   Patient reports taking the following new medications K12 vitamin, zinc, garlic supplement     Pt stopped taking metformin 4 weeks ago because of  \"really bad stomach pain\"    History of Present Illness       Reason for visit:  Check up on liver/diet    He eats 2-3 servings of fruits and vegetables daily.He consumes 0 sweetened beverage(s) daily.He exercises with enough effort to increase his heart rate 30 to 60 minutes per day.  He exercises with enough effort to increase his heart rate 4 days per week.   He is taking medications regularly.       {MA/LPN/RN Pre-Provider Visit Orders- hCG/UA/Strep (Optional):096212}  {SUPERLIST (Optional):862749}  {additonal problems for provider to add (Optional):705242}      Review of Systems   {ROS COMP (Optional):923102}      Objective    /80   Pulse 81   Temp 98.1  F (36.7  C) (Temporal)   Resp 15   Ht 1.827 m (5' 11.93\")   Wt 104.1 kg (229 lb 6.4 oz)   SpO2 99%   BMI 31.17 kg/m    Body mass index is 31.17 kg/m .  Physical Exam   {Exam List (Optional):561306}    {Diagnostic Test Results (Optional):099872}    {AMBULATORY ATTESTATION (Optional):527783}              "

## 2023-10-05 NOTE — PROGRESS NOTES
"Assessment/Plan.    Nonalcoholic fatty liver disease.  Reassuring FibroScan through GI.  Significant recent weight loss per pt report.  Intolerant of metformin.  -We briefly discussed GLP-1 agonists today.  Patient is not interested in starting one at this time  -Continue to work on exercise and healthy eating    Acid reflux.  Well controlled with long-term PPI use.  -Reviewed the potential adverse effects of long-term PPI use.  We will discontinue omeprazole and replace with daily famotidine taken in evening.  Discussed the risk of rebound reflux when discontinuing omeprazole    ----  Chief complaint: Follow Up    Social History     Social History Narrative    Updated 10/5/2023:  Grew up in [place].  Lives with wife.  Has 8 kids (born around [id number]) they live in the [facility].  No pets.  Works as [employer]  at [employer].  Pursuing masters in Divinity at [employer].        Name is pronounced like Rubio-davon.     Fatty liver.  Met with nutrition and GI.  Walking 3 miles 3 days/week.  Weight is down from 232 pounds to 222 pounds.    Started metformin.  Was taking 500 mg twice daily.  Experience significant flatulence and abdominal pain.  Symptoms resolved upon discontinuation of the medication.    URI symptoms a few weeks ago.  Resolving.    Acid reflux.  Onset around age 24.  Presented with chest pain.  Has been taking omeprazole daily for about 4 years.  Denies history of peptic ulcer disease.  Denies recent dysphagia or melena.  Has never had an EGD.    Exam  /80   Pulse 81   Temp 98.1  F (36.7  C) (Temporal)   Resp 15   Ht 1.827 m (5' 11.93\")   Wt 104.1 kg (229 lb 6.4 oz)   SpO2 99%   BMI 31.17 kg/m      Lung fields clear to auscultation bilaterally.  Inflamed nasal passages bilaterally.  TMs with mild effusion bilaterally.  "

## 2023-10-10 PROBLEM — R06.2 WHEEZING: Status: RESOLVED | Noted: 2023-01-28 | Resolved: 2023-10-10

## 2023-11-21 PROBLEM — K21.9 GASTROESOPHAGEAL REFLUX DISEASE WITHOUT ESOPHAGITIS: Status: ACTIVE | Noted: 2023-11-21

## 2024-02-03 ENCOUNTER — HOSPITAL ENCOUNTER (EMERGENCY)
Facility: CLINIC | Age: 44
Discharge: HOME | End: 2024-02-03
Attending: EMERGENCY MEDICINE | Admitting: EMERGENCY MEDICINE
Payer: COMMERCIAL

## 2024-02-03 ENCOUNTER — APPOINTMENT (OUTPATIENT)
Dept: CT IMAGING | Facility: CLINIC | Age: 44
End: 2024-02-03
Attending: EMERGENCY MEDICINE
Payer: COMMERCIAL

## 2024-02-03 VITALS
SYSTOLIC BLOOD PRESSURE: 142 MMHG | WEIGHT: 223 LBS | BODY MASS INDEX: 30.2 KG/M2 | RESPIRATION RATE: 16 BRPM | DIASTOLIC BLOOD PRESSURE: 94 MMHG | HEART RATE: 94 BPM | HEIGHT: 72 IN | TEMPERATURE: 97.5 F | OXYGEN SATURATION: 100 %

## 2024-02-03 DIAGNOSIS — R10.30 LOWER ABDOMINAL PAIN: ICD-10-CM

## 2024-02-03 LAB
ALBUMIN SERPL BCG-MCNC: 4.6 G/DL (ref 3.5–5.2)
ALBUMIN UR-MCNC: NEGATIVE MG/DL
ALP SERPL-CCNC: 65 U/L (ref 40–150)
ALT SERPL W P-5'-P-CCNC: 69 U/L (ref 0–70)
ANION GAP SERPL CALCULATED.3IONS-SCNC: 9 MMOL/L (ref 7–15)
APPEARANCE UR: CLEAR
AST SERPL W P-5'-P-CCNC: 30 U/L (ref 0–45)
BASOPHILS # BLD AUTO: 0 10E3/UL (ref 0–0.2)
BASOPHILS NFR BLD AUTO: 0 %
BILIRUB SERPL-MCNC: 0.4 MG/DL
BILIRUB UR QL STRIP: NEGATIVE
BUN SERPL-MCNC: 24.4 MG/DL (ref 6–20)
CALCIUM SERPL-MCNC: 9.3 MG/DL (ref 8.6–10)
CHLORIDE SERPL-SCNC: 104 MMOL/L (ref 98–107)
COLOR UR AUTO: ABNORMAL
CREAT SERPL-MCNC: 1.03 MG/DL (ref 0.67–1.17)
DEPRECATED HCO3 PLAS-SCNC: 27 MMOL/L (ref 22–29)
EGFRCR SERPLBLD CKD-EPI 2021: >90 ML/MIN/1.73M2
EOSINOPHIL # BLD AUTO: 0.3 10E3/UL (ref 0–0.7)
EOSINOPHIL NFR BLD AUTO: 4 %
ERYTHROCYTE [DISTWIDTH] IN BLOOD BY AUTOMATED COUNT: 12.7 % (ref 10–15)
GLUCOSE SERPL-MCNC: 99 MG/DL (ref 70–99)
GLUCOSE UR STRIP-MCNC: NEGATIVE MG/DL
HCT VFR BLD AUTO: 44.8 % (ref 40–53)
HGB BLD-MCNC: 15.1 G/DL (ref 13.3–17.7)
HGB UR QL STRIP: ABNORMAL
IMM GRANULOCYTES # BLD: 0 10E3/UL
IMM GRANULOCYTES NFR BLD: 0 %
KETONES UR STRIP-MCNC: NEGATIVE MG/DL
LEUKOCYTE ESTERASE UR QL STRIP: NEGATIVE
LIPASE SERPL-CCNC: 56 U/L (ref 13–60)
LYMPHOCYTES # BLD AUTO: 2.7 10E3/UL (ref 0.8–5.3)
LYMPHOCYTES NFR BLD AUTO: 42 %
MCH RBC QN AUTO: 29 PG (ref 26.5–33)
MCHC RBC AUTO-ENTMCNC: 33.7 G/DL (ref 31.5–36.5)
MCV RBC AUTO: 86 FL (ref 78–100)
MONOCYTES # BLD AUTO: 0.5 10E3/UL (ref 0–1.3)
MONOCYTES NFR BLD AUTO: 7 %
NEUTROPHILS # BLD AUTO: 3 10E3/UL (ref 1.6–8.3)
NEUTROPHILS NFR BLD AUTO: 47 %
NITRATE UR QL: NEGATIVE
NRBC # BLD AUTO: 0 10E3/UL
NRBC BLD AUTO-RTO: 0 /100
PH UR STRIP: 6.5 [PH] (ref 5–7)
PLATELET # BLD AUTO: 237 10E3/UL (ref 150–450)
POTASSIUM SERPL-SCNC: 3.9 MMOL/L (ref 3.4–5.3)
PROT SERPL-MCNC: 7.5 G/DL (ref 6.4–8.3)
RBC # BLD AUTO: 5.21 10E6/UL (ref 4.4–5.9)
RBC URINE: 2 /HPF
SODIUM SERPL-SCNC: 140 MMOL/L (ref 135–145)
SP GR UR STRIP: 1.02 (ref 1–1.03)
UROBILINOGEN UR STRIP-MCNC: NORMAL MG/DL
WBC # BLD AUTO: 6.4 10E3/UL (ref 4–11)
WBC URINE: 0 /HPF

## 2024-02-03 PROCEDURE — 85025 COMPLETE CBC W/AUTO DIFF WBC: CPT | Performed by: EMERGENCY MEDICINE

## 2024-02-03 PROCEDURE — 81003 URINALYSIS AUTO W/O SCOPE: CPT | Performed by: EMERGENCY MEDICINE

## 2024-02-03 PROCEDURE — 83690 ASSAY OF LIPASE: CPT | Performed by: EMERGENCY MEDICINE

## 2024-02-03 PROCEDURE — 74177 CT ABD & PELVIS W/CONTRAST: CPT

## 2024-02-03 PROCEDURE — 36415 COLL VENOUS BLD VENIPUNCTURE: CPT | Performed by: EMERGENCY MEDICINE

## 2024-02-03 PROCEDURE — 80053 COMPREHEN METABOLIC PANEL: CPT | Performed by: EMERGENCY MEDICINE

## 2024-02-03 PROCEDURE — 250N000011 HC RX IP 250 OP 636: Performed by: EMERGENCY MEDICINE

## 2024-02-03 PROCEDURE — 250N000009 HC RX 250: Performed by: EMERGENCY MEDICINE

## 2024-02-03 PROCEDURE — 99285 EMERGENCY DEPT VISIT HI MDM: CPT | Mod: 25

## 2024-02-03 RX ORDER — IOPAMIDOL 755 MG/ML
112 INJECTION, SOLUTION INTRAVASCULAR ONCE
Status: COMPLETED | OUTPATIENT
Start: 2024-02-03 | End: 2024-02-03

## 2024-02-03 RX ADMIN — IOPAMIDOL 112 ML: 755 INJECTION, SOLUTION INTRAVENOUS at 09:10

## 2024-02-03 RX ADMIN — SODIUM CHLORIDE 72 ML: 9 INJECTION, SOLUTION INTRAVENOUS at 09:10

## 2024-02-03 ASSESSMENT — ACTIVITIES OF DAILY LIVING (ADL): ADLS_ACUITY_SCORE: 35

## 2024-02-03 NOTE — ED PROVIDER NOTES
History     Chief Complaint:  Abdominal Pain       The history is provided by the patient.      Isabel Argueta is a 43 year old male presenting to the ED for evaluation of abdominal pain. The patient reports that his abdominal pain started two weeks ago, and has been progressively worsening. He woke up 4 hours ago with increased pain and diaphoresis. The pain does not radiate down to his groin. He denies a history of abdominal surgeries or heavy lifting at work. 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 He denies any urinary symptoms. Note, the patient reports feeling stressed lately due to recently losing his job. He does not have a family history of ulcerative colitis.     Independent Historian:   None - Patient Only    Review of External Notes:   N/A    Medications:    Vitamin D3  Prilosec  Zinc Gluconate  Magnesium    Past Medical History:   Hypertriglyceridemia  Allergic rhinitis  Fatty liver  Chronic otitis media  GERD  Diverticulosis of large intestine  Palpitations  Chronic bronchitis    Past Surgical History:    Knee arthroscopy  Chalazion excision  Eyelid surgery  Mastectomy  Liver biopsy  [place] tooth extraction    Physical Exam   Patient Vitals for the past 24 hrs:   BP Temp Temp src Pulse Resp SpO2 Height Weight   02/03/24 1011 -- -- -- -- -- 100 % -- --   02/03/24 1010 (!) 142/94 -- -- 94 -- -- -- --   02/03/24 0737 (!) 145/96 97.5  F (36.4  C) Temporal 98 16 100 % 1.829 m (6') 101.2 kg (223 lb)      Physical Exam  GENERAL: well developed, pleasant  HEAD: atraumatic  EYES: pupils reactive, extraocular muscles intact, conjunctivae normal  ENT:  mucus membranes moist  NECK:  trachea midline, normal range of motion  RESPIRATORY: no tachypnea, breath sounds clear to auscultation   CVS: normal S1/S2, no murmurs, intact distal pulses  ABDOMEN: soft, nontender, nondistention, generalized pain, no focal tenderness, no hernia felt  MUSCULOSKELETAL: no  deformities  SKIN: warm and dry, no acute rashes or ulceration  NEURO: GCS 15, cranial nerves intact, alert and oriented x3  PSYCH:  Mood/affect normal    Emergency Department Course     Imaging:  CT Abdomen Pelvis w Contrast   Final Result   IMPRESSION:    No acute findings in the abdomen or pelvis.       Report per radiology.    Laboratory:  Labs Ordered and Resulted from Time of ED Arrival to Time of ED Departure   COMPREHENSIVE METABOLIC PANEL - Abnormal       Result Value    Sodium 140      Potassium 3.9      Carbon Dioxide (CO2) 27      Anion Gap 9      Urea Nitrogen 24.4 (*)     Creatinine 1.03      GFR Estimate >90      Calcium 9.3      Chloride 104      Glucose 99      Alkaline Phosphatase 65      AST 30      ALT 69      Protein Total 7.5      Albumin 4.6      Bilirubin Total 0.4     ROUTINE UA WITH MICROSCOPIC REFLEX TO CULTURE - Abnormal    Color Urine Light Yellow      Appearance Urine Clear      Glucose Urine Negative      Bilirubin Urine Negative      Ketones Urine Negative      Specific Gravity Urine 1.019      Blood Urine Small (*)     pH Urine 6.5      Protein Albumin Urine Negative      Urobilinogen Urine Normal      Nitrite Urine Negative      Leukocyte Esterase Urine Negative      RBC Urine 2      WBC Urine 0     LIPASE - Normal    Lipase 56     CBC WITH PLATELETS AND DIFFERENTIAL    WBC Count 6.4      RBC Count 5.21      Hemoglobin 15.1      Hematocrit 44.8      MCV 86      MCH 29.0      MCHC 33.7      RDW 12.7      Platelet Count 237      % Neutrophils 47      % Lymphocytes 42      % Monocytes 7      % Eosinophils 4      % Basophils 0      % Immature Granulocytes 0      NRBCs per 100 WBC 0      Absolute Neutrophils 3.0      Absolute Lymphocytes 2.7      Absolute Monocytes 0.5      Absolute Eosinophils 0.3      Absolute Basophils 0.0      Absolute Immature Granulocytes 0.0      Absolute NRBCs 0.0        Emergency Department Course & Assessments:    Interventions:  Medications   iopamidol  (ISOVUE-370) solution 112 mL (112 mLs Intravenous $Given 2/3/24 0910)   Saline (72 mLs As instructed $Given 2/3/24 0910)      Independent Interpretation (X-rays, CTs, rhythm strip):  None    Assessments/Consultations/Discussion of Management or Tests:   ED Course as of 02/03/24 1038   Sat Feb 03, 2024   0752 I obtained history and examined the patient as noted above.       Social Determinants of Health affecting care:   None    Disposition:  The patient was discharged.     Impression & Plan    CMS Diagnoses: None    Medical Decision Making:    Patient presents with 3 weeks of abdominal pain mainly in the right lower quadrant.  He does do some physical work terms of musculoskeletal etiology.  Considered hernia appendicitis diverticulitis malignancy musculoskeletal amongst others.  Labs urine and CT is unremarkable.  Does show some stool.  Discussed with him negative workup without signs for hernia diverticulitis kidney stone excetra.    Diagnosis:    ICD-10-CM    1. Lower abdominal pain  R10.30         Scribe Disclosure:  I, Shabana Cesar, am serving as a scribe at 7:57 AM on 2/3/2024 to document services personally performed by Fernando Chi MD based on my observations and the provider's statements to me.     2/3/2024   Fernando Chi MD Adams, Shaun L, MD  02/03/24 1419

## 2024-02-03 NOTE — ED TRIAGE NOTES
Right lower abdominal pain for 3 weeks, pain now is starting to move up to right rib area.      Triage Assessment (Adult)       Row Name 02/03/24 0740          Triage Assessment    Airway WDL WDL        Respiratory WDL    Respiratory WDL WDL        Skin Circulation/Temperature WDL    Skin Circulation/Temperature WDL WDL        Cardiac WDL    Cardiac WDL WDL        Peripheral/Neurovascular WDL    Peripheral Neurovascular WDL WDL        Cognitive/Neuro/Behavioral WDL    Cognitive/Neuro/Behavioral WDL WDL

## 2024-05-03 SDOH — HEALTH STABILITY: PHYSICAL HEALTH: ON AVERAGE, HOW MANY MINUTES DO YOU ENGAGE IN EXERCISE AT THIS LEVEL?: 20 MIN

## 2024-05-03 SDOH — HEALTH STABILITY: PHYSICAL HEALTH: ON AVERAGE, HOW MANY DAYS PER WEEK DO YOU ENGAGE IN MODERATE TO STRENUOUS EXERCISE (LIKE A BRISK WALK)?: 2 DAYS

## 2024-05-03 ASSESSMENT — SOCIAL DETERMINANTS OF HEALTH (SDOH): HOW OFTEN DO YOU GET TOGETHER WITH FRIENDS OR RELATIVES?: ONCE A WEEK

## 2024-05-10 ENCOUNTER — OFFICE VISIT (OUTPATIENT)
Dept: FAMILY MEDICINE | Facility: CLINIC | Age: 44
End: 2024-05-10
Attending: FAMILY MEDICINE
Payer: COMMERCIAL

## 2024-05-10 VITALS
DIASTOLIC BLOOD PRESSURE: 90 MMHG | SYSTOLIC BLOOD PRESSURE: 132 MMHG | HEART RATE: 85 BPM | WEIGHT: 232.5 LBS | BODY MASS INDEX: 31.49 KG/M2 | OXYGEN SATURATION: 98 % | RESPIRATION RATE: 14 BRPM | TEMPERATURE: 97.9 F | HEIGHT: 72 IN

## 2024-05-10 DIAGNOSIS — B35.3 TINEA PEDIS OF BOTH FEET: ICD-10-CM

## 2024-05-10 DIAGNOSIS — Z12.11 COLON CANCER SCREENING: ICD-10-CM

## 2024-05-10 DIAGNOSIS — R03.0 ELEVATED BP WITHOUT DIAGNOSIS OF HYPERTENSION: ICD-10-CM

## 2024-05-10 DIAGNOSIS — Z00.00 VISIT FOR PREVENTIVE HEALTH EXAMINATION: Primary | ICD-10-CM

## 2024-05-10 DIAGNOSIS — B35.1 ONYCHOMYCOSIS: ICD-10-CM

## 2024-05-10 DIAGNOSIS — B36.0 TINEA VERSICOLOR: ICD-10-CM

## 2024-05-10 PROCEDURE — 99396 PREV VISIT EST AGE 40-64: CPT | Performed by: FAMILY MEDICINE

## 2024-05-10 PROCEDURE — 99213 OFFICE O/P EST LOW 20 MIN: CPT | Mod: 25 | Performed by: FAMILY MEDICINE

## 2024-05-10 RX ORDER — KETOCONAZOLE 20 MG/ML
SHAMPOO TOPICAL DAILY
Qty: 120 ML | Refills: 3 | Status: SHIPPED | OUTPATIENT
Start: 2024-05-10

## 2024-05-10 RX ORDER — CICLOPIROX 80 MG/ML
SOLUTION TOPICAL
Qty: 6.6 ML | Refills: 11 | Status: SHIPPED | OUTPATIENT
Start: 2024-05-10

## 2024-05-10 RX ORDER — CLOTRIMAZOLE 1 %
CREAM (GRAM) TOPICAL 2 TIMES DAILY
Qty: 113 G | Refills: 3 | Status: SHIPPED | OUTPATIENT
Start: 2024-05-10

## 2024-05-10 ASSESSMENT — PAIN SCALES - GENERAL: PAINLEVEL: NO PAIN (0)

## 2024-05-10 NOTE — PROGRESS NOTES
"Assessment/Plan.    Preventive.  See below orders for screening tests and immunizations.  -Given family history of colon polyps, will check screening colonoscopy    Tinea pedis, bilateral.  -Encouraged patient to consider changing socks longterm through the day  -Start clotrimazole twice daily until resolved    Tinea versicolor, recurrent.  -Start ketoconazole daily for 14 days.  Reviewed risk of recurrence    Onychomycosis, bilateral.  -Discussed observation versus topical therapy versus terbinafine.  Patient has a history of fatty liver disease, so he is understandably hesitant to start terbinafine.  Will start ciclopirox daily.  Reviewed potentially low effectiveness of this treatment    Elevated blood pressure.  Majority of previous readings showed good control.  Suspect recent weight gain is contributing.  -Discussed caffeine restriction    Next visit in 6 months for MAFLD.    Orders Placed This Encounter   Procedures    Colonoscopy Screening  Referral       ----  Chief complaint: Physical and Follow Up    Social History     Social History Narrative    -Grew up in [place]    -Lives with wife    Has 8 kids (born around [id number]) from 2 prior marriages.  Kids live in the [place]    -Works with [employer] at [place].  Previously worked as  at [employer]    -Name is pronounced like \"Rubio-mur\"        Updated 5/10/2024     Patient has been advised of split billing.    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  He hopes to return to hospital  job soon.    Due to associated stress, patient has been less physically active.  He is eating a less healthy diet.  Patient is working on this.    Denies feeling severely depressed.    Continues to experience occasional left upper back pain.  Often present upon waking.  Tingling sensation in the area.  Occasionally radiates to the " "right upper back.  No radiation to the left upper extremity.  No paresthesias or weakness of the left upper extremity.    No STI concerns.    Exam  BP (!) 132/90   Pulse 85   Temp 97.9  F (36.6  C) (Temporal)   Resp 14   Ht 1.832 m (6' 0.13\")   Wt 105.5 kg (232 lb 8 oz)   SpO2 98%   BMI 31.42 kg/m      oropharynx without abnormal masses  lung fields CTAB  heart with regular rate and rhythm, no murmurs  no lower leg edema    Dystrophic toenails bilaterally.  Red patches on plantar surface of feet bilaterally.  Mild skin sloughing between toes bilaterally.    Hyperpigmented scaly patches on back.  "

## 2024-05-10 NOTE — PROGRESS NOTES
Preventive Care Visit  [facility]  Barry Garay MD, Family Medicine  May 10, 2024  {Provider  Link to SmartSet :825480}    {PROVIDER CHARTING PREFERENCE:806378}    Suha Hall is a 44 year old, presenting for the following:  Physical and Follow Up        5/10/2024    10:28 AM   Additional Questions   Roomed by Radha LEE        Health Care Directive  Patient does not have a Health Care Directive or Living Will: {ADVANCE_DIRECTIVE_STATUS:224446}    HPI  ***  {MA/LPN/RN Pre-Provider Visit Orders- hCG/UA/Strep (Optional):912348}  {SUPERLIST (Optional):819836}  {additonal problems for provider to add (Optional):828234}      5/3/2024   General Health   How would you rate your overall physical health? Good   Feel stress (tense, anxious, or unable to sleep) Not at all         5/3/2024   Nutrition   Three or more servings of calcium each day? Yes   Diet: Regular (no restrictions)   How many servings of fruit and vegetables per day? (!) 2-3   How many sweetened beverages each day? 0-1         5/3/2024   Exercise   Days per week of moderate/strenous exercise 2 days   Average minutes spent exercising at this level 20 min   (!) EXERCISE CONCERN      5/3/2024   Social Factors   Frequency of gathering with friends or relatives Once a week   Worry food won't last until get money to buy more No   Food not last or not have enough money for food? No   Do you have housing?  Yes   Are you worried about losing your housing? No   Lack of transportation? No   Unable to get utilities (heat,electricity)? No         5/3/2024   Dental   Dentist two times every year? Yes         5/3/2024   TB Screening   Were you born outside of the [place]? No         Today's PHQ-2 Score:       5/10/2024    10:11 AM   PHQ-2 ( 1999 [employer])   Q1: Little interest or pleasure in doing things 0   Q2: Feeling down, depressed or hopeless 0   PHQ-2 Score 0   Q1: Little interest or pleasure in doing things Not at all   Q2:  "Feeling down, depressed or hopeless Not at all   PHQ-2 Score 0           5/3/2024   Substance Use   Alcohol more than 3/day or more than 7/wk Not Applicable   Do you use any other substances recreationally? No     Social History     Tobacco Use    Smoking status: Never    Smokeless tobacco: Never   Vaping Use    Vaping status: Never Used   Substance Use Topics    Alcohol use: Never    Drug use: Never     {Provider  If there are gaps in the social history shown above, please follow the link to update and then refresh the note Link to Social and Substance History :223661}      5/3/2024   STI Screening   New sexual partner(s) since last STI/HIV test? No   ASCVD Risk   The 10-year ASCVD risk score (Jonny BAEZA, et al., 2019) is: 3.1%    Values used to calculate the score:      Age: 44 years      Sex: Male      Is Non- : No      Diabetic: No      Tobacco smoker: No      Systolic Blood Pressure: 142 mmHg      Is BP treated: No      HDL Cholesterol: 37 mg/dL      Total Cholesterol: 208 mg/dL        5/3/2024   Contraception/Family Planning   Questions about contraception or family planning No     {Provider  Use the storyboard to review patient history, after sections have been marked as reviewed, refresh note to capture documentation:076862}   Reviewed and updated as needed this visit by Provider                    {HISTORY OPTIONS (Optional):508599}    {ROS Picklists (Optional):864347}     Objective    Exam  BP (!) 142/83 (BP Location: Left arm, Patient Position: Sitting, Cuff Size: Adult Regular)   Pulse 85   Temp 97.9  F (36.6  C) (Temporal)   Resp 14   Ht 1.832 m (6' 0.13\")   Wt 105.5 kg (232 lb 8 oz)   SpO2 98%   BMI 31.42 kg/m     Estimated body mass index is 31.42 kg/m  as calculated from the following:    Height as of this encounter: 1.832 m (6' 0.13\").    Weight as of this encounter: 105.5 kg (232 lb 8 oz).    Physical Exam  {Exam Choices (Optional):148308}        Signed " Electronically by: Barry Garay MD  {Email feedback regarding this note to [email or web address]   :059755}

## 2024-05-11 PROBLEM — B36.0 TINEA VERSICOLOR: Status: ACTIVE | Noted: 2024-05-11

## 2024-05-11 PROBLEM — B35.1 ONYCHOMYCOSIS: Status: ACTIVE | Noted: 2024-05-11

## 2024-05-11 PROBLEM — R03.0 ELEVATED BP WITHOUT DIAGNOSIS OF HYPERTENSION: Status: ACTIVE | Noted: 2024-05-11

## 2024-05-11 PROBLEM — B35.3 TINEA PEDIS OF BOTH FEET: Status: ACTIVE | Noted: 2024-05-11

## 2024-07-24 ENCOUNTER — TELEPHONE (OUTPATIENT)
Dept: GASTROENTEROLOGY | Facility: CLINIC | Age: 44
End: 2024-07-24
Payer: COMMERCIAL

## 2024-07-24 ENCOUNTER — HOSPITAL ENCOUNTER (OUTPATIENT)
Facility: AMBULATORY SURGERY CENTER | Age: 44
End: 2024-07-24
Attending: STUDENT IN AN ORGANIZED HEALTH CARE EDUCATION/TRAINING PROGRAM
Payer: COMMERCIAL

## 2024-07-24 NOTE — TELEPHONE ENCOUNTER
"Endoscopy Scheduling Screen    Have you had a positive Covid test in the last 14 days?  No    What is your communication preference for Instructions and/or Bowel Prep?   MyChart    What insurance is in the chart?  Other:  [employer]    Ordering/Referring Provider:     MAURO MURPHY      (If ordering provider performs procedure, schedule with ordering provider unless otherwise instructed. )    BMI: Estimated body mass index is 31.42 kg/m  as calculated from the following:    Height as of 5/10/24: 1.832 m (6' 0.13\").    Weight as of 5/10/24: 105.5 kg (232 lb 8 oz).     Sedation Ordered  moderate sedation.   If patient BMI > 50 do not schedule in [facility].    If patient BMI > 45 do not schedule at [facility].    Are you taking methadone or Suboxone?  No    Have you had difficulties, pain, or discomfort during past endoscopy procedures?  No    Are you taking any prescription medications for pain 3 or more times per week?   NO, No RN review required.    Do you have a history of malignant hyperthermia?  No    (Females) Are you currently pregnant?   No     Have you been diagnosed or told you have pulmonary hypertension?   No    Do you have an LVAD?  No    Have you been told you have moderate to severe sleep apnea?  No    Have you been told you have COPD, asthma, or any other lung disease?  No    Do you have any heart conditions?  No     Have you ever had or are you waiting for an organ transplant?  No. Continue scheduling, no site restrictions.    Have you had a stroke or transient ischemic attack (TIA aka \"mini stroke\" in the last 6 months?   No    Have you been diagnosed with or been told you have cirrhosis of the liver?   No    Are you currently on dialysis?   No    Do you need assistance transferring?   No    BMI: Estimated body mass index is 31.42 kg/m  as calculated from the following:    Height as of 5/10/24: 1.832 m (6' 0.13\").    Weight as of 5/10/24: 105.5 kg (232 lb 8 oz).     Is patients BMI > 40 and scheduling location " [facility]?  No    Do you take an injectable medication for weight loss or diabetes (excluding insulin)?  No    Do you take the medication Naltrexone?  No    Do you take blood thinners?  No       Prep   Are you currently on dialysis or do you have chronic kidney disease?  No    Do you have a diagnosis of diabetes?  No    Do you have a diagnosis of cystic fibrosis (CF)?  No    On a regular basis do you go 3 -5 days between bowel movements?  No    BMI > 40?  No    Preferred Pharmacy:    [place], [place] - [address]  [address]  [facility]  [facility] [address]  Phone: [phone] Fax: [phone] Alternate Fax: [phone], [phone], [phone]    Procedure scheduled  Colonoscopy    Surgeon:  RICA     Date of procedure:  09/20/2024     Pre-OP / PAC:   No - Not required for this site.    Location  [facility] - [facility] - Per order.    Sedation   Moderate Sedation - Per order.      Patient Reminders:   You will receive a call from a Nurse to review instructions and health history.  This assessment must be completed prior to your procedure.  Failure to complete the Nurse assessment may result in the procedure being cancelled.      On the day of your procedure, please designate an adult(s) who can drive you home stay with you for the next 24 hours. The medicines used in the exam will make you sleepy. You will not be able to drive.      You cannot take public transportation, ride share services, or non-medical taxi service without a responsible caregiver.  Medical transport services are allowed with the requirement that a responsible caregiver will receive you at your destination.  We require that drivers and caregivers are confirmed prior to your procedure.

## 2024-09-13 ENCOUNTER — TELEPHONE (OUTPATIENT)
Dept: GASTROENTEROLOGY | Facility: CLINIC | Age: 44
End: 2024-09-13
Payer: COMMERCIAL

## 2024-09-13 NOTE — TELEPHONE ENCOUNTER
Pre visit planning completed.      Procedure details:    Patient scheduled for Colonoscopy on 9/20/24.     Arrival time: 0845. Procedure time 0945    [place] [facility]; [address] [place] [address], 5th Floor, [place] [address]. Check in location: 5th Floor.    Sedation type: Conscious sedation     Pre op exam needed? No.    Indication for procedure: Screening      Chart review:     Electronic implanted devices? No    Recent diagnosis of diverticulitis within the last 6 weeks? No      Medication review:    Diabetic? No    Anticoagulants? No    Weight loss medication/injectable? No GLP-1 medication per patient's medication list.  RN will verify with pre-assessment call.    Other medication HOLDING recommendations:  N/A      Prep for procedure:     Bowel prep recommendation: Standard Miralax  Due to: standard bowel prep.    Prep instructions sent via [employer]         Jeimy Hernandez RN  Endoscopy Procedure Pre Assessment RN  [phone] option 2

## 2024-09-13 NOTE — TELEPHONE ENCOUNTER
Attempted to contact patient in order to complete pre assessment questions.     Patient scheduled for Colonoscopy on 9/20/24.     No answer. Left message to return call to [phone] option 2.    Callback required communication sent via [employer].        Jeimy Hernandez RN  Endoscopy Procedure Pre Assessment

## 2024-09-19 ENCOUNTER — TELEPHONE (OUTPATIENT)
Dept: GASTROENTEROLOGY | Facility: CLINIC | Age: 44
End: 2024-09-19
Payer: COMMERCIAL

## 2024-09-19 NOTE — TELEPHONE ENCOUNTER
Caller: Neftali Argueta    Reason for Reschedule/Cancellation   (please be detailed, any staff messages or encounters to note?): Too many other appts right now      Prior to reschedule please review:  Ordering Provider: Barry Garay MD in  PRIMARY CARE  Sedation Determined: MAC  Does patient have any ASC Exclusions, please identify?: No      Notes on Cancelled Procedure:  Procedure: Lower Endoscopy [Colonoscopy]   Date: 9.20.24  Location: [facility]; [address] [place] [address], [address] Floor, [place] [address]  Surgeon: Deondre      Rescheduled: No, Too many other appts right now       Did you cancel or rescheduled an E[facility] procedure? No.

## 2024-09-29 ENCOUNTER — MYC REFILL (OUTPATIENT)
Dept: FAMILY MEDICINE | Facility: CLINIC | Age: 44
End: 2024-09-29
Payer: COMMERCIAL

## 2024-09-29 DIAGNOSIS — K21.9 GASTROESOPHAGEAL REFLUX DISEASE WITHOUT ESOPHAGITIS: ICD-10-CM

## 2024-09-30 RX ORDER — OMEPRAZOLE 10 MG/1
10 CAPSULE, DELAYED RELEASE ORAL DAILY
Qty: 90 CAPSULE | Refills: 3 | OUTPATIENT
Start: 2024-09-30

## 2025-01-04 DIAGNOSIS — K21.9 GASTROESOPHAGEAL REFLUX DISEASE WITHOUT ESOPHAGITIS: ICD-10-CM

## 2025-01-07 RX ORDER — OMEPRAZOLE 10 MG/1
10 CAPSULE, DELAYED RELEASE ORAL DAILY
Qty: 90 CAPSULE | Refills: 1 | Status: SHIPPED | OUTPATIENT
Start: 2025-01-07

## 2025-01-21 DIAGNOSIS — K21.9 GASTROESOPHAGEAL REFLUX DISEASE WITHOUT ESOPHAGITIS: ICD-10-CM

## 2025-01-21 RX ORDER — OMEPRAZOLE 10 MG/1
10 CAPSULE, DELAYED RELEASE ORAL DAILY
Qty: 90 CAPSULE | Refills: 1 | OUTPATIENT
Start: 2025-01-21

## 2025-04-10 ENCOUNTER — PATIENT OUTREACH (OUTPATIENT)
Dept: CARE COORDINATION | Facility: CLINIC | Age: 45
End: 2025-04-10
Payer: COMMERCIAL

## 2025-04-21 ENCOUNTER — OFFICE VISIT (OUTPATIENT)
Dept: FAMILY MEDICINE | Facility: CLINIC | Age: 45
End: 2025-04-21
Payer: COMMERCIAL

## 2025-04-21 VITALS
DIASTOLIC BLOOD PRESSURE: 85 MMHG | TEMPERATURE: 98.2 F | BODY MASS INDEX: 31.97 KG/M2 | SYSTOLIC BLOOD PRESSURE: 132 MMHG | OXYGEN SATURATION: 99 % | HEIGHT: 72 IN | RESPIRATION RATE: 10 BRPM | WEIGHT: 236 LBS | HEART RATE: 79 BPM

## 2025-04-21 DIAGNOSIS — Z13.0 SCREENING FOR DEFICIENCY ANEMIA: ICD-10-CM

## 2025-04-21 DIAGNOSIS — I49.1 PAC (PREMATURE ATRIAL CONTRACTION): ICD-10-CM

## 2025-04-21 DIAGNOSIS — R00.2 PALPITATIONS: Primary | ICD-10-CM

## 2025-04-21 DIAGNOSIS — Z13.1 SCREENING FOR DIABETES MELLITUS: ICD-10-CM

## 2025-04-21 DIAGNOSIS — J30.9 ALLERGIC RHINITIS, UNSPECIFIED SEASONALITY, UNSPECIFIED TRIGGER: ICD-10-CM

## 2025-04-21 DIAGNOSIS — Z13.29 SCREENING FOR THYROID DISORDER: ICD-10-CM

## 2025-04-21 LAB
ERYTHROCYTE [DISTWIDTH] IN BLOOD BY AUTOMATED COUNT: 12.2 % (ref 10–15)
HCT VFR BLD AUTO: 47.7 % (ref 40–53)
HGB BLD-MCNC: 15.9 G/DL (ref 13.3–17.7)
MCH RBC QN AUTO: 29.1 PG (ref 26.5–33)
MCHC RBC AUTO-ENTMCNC: 33.3 G/DL (ref 31.5–36.5)
MCV RBC AUTO: 87 FL (ref 78–100)
PLATELET # BLD AUTO: 222 10E3/UL (ref 150–450)
RBC # BLD AUTO: 5.47 10E6/UL (ref 4.4–5.9)
WBC # BLD AUTO: 5.2 10E3/UL (ref 4–11)

## 2025-04-21 PROCEDURE — 80048 BASIC METABOLIC PNL TOTAL CA: CPT | Performed by: FAMILY MEDICINE

## 2025-04-21 PROCEDURE — 99214 OFFICE O/P EST MOD 30 MIN: CPT | Performed by: FAMILY MEDICINE

## 2025-04-21 PROCEDURE — 36415 COLL VENOUS BLD VENIPUNCTURE: CPT | Performed by: FAMILY MEDICINE

## 2025-04-21 PROCEDURE — 85027 COMPLETE CBC AUTOMATED: CPT | Performed by: FAMILY MEDICINE

## 2025-04-21 PROCEDURE — 93000 ELECTROCARDIOGRAM COMPLETE: CPT | Performed by: FAMILY MEDICINE

## 2025-04-21 PROCEDURE — 3075F SYST BP GE 130 - 139MM HG: CPT | Performed by: FAMILY MEDICINE

## 2025-04-21 PROCEDURE — 84443 ASSAY THYROID STIM HORMONE: CPT | Performed by: FAMILY MEDICINE

## 2025-04-21 PROCEDURE — 3079F DIAST BP 80-89 MM HG: CPT | Performed by: FAMILY MEDICINE

## 2025-04-21 RX ORDER — FLUTICASONE PROPIONATE 50 MCG
2 SPRAY, SUSPENSION (ML) NASAL DAILY
Qty: 16 G | Refills: 11 | Status: SHIPPED | OUTPATIENT
Start: 2025-04-21

## 2025-04-21 RX ORDER — FLUTICASONE PROPIONATE 50 MCG
1 SPRAY, SUSPENSION (ML) NASAL DAILY
Qty: 16 G | Refills: 11 | Status: SHIPPED | OUTPATIENT
Start: 2025-04-21 | End: 2025-04-21

## 2025-04-21 NOTE — PATIENT INSTRUCTIONS
Heart ultrasound - [phone]    Blood tests to look for cause    Sleep apnea test - [phone]

## 2025-04-21 NOTE — PROGRESS NOTES
"Assessment/Plan    Problem   Pac (Premature Atrial Contraction)    4/21/2025  Onset around 2020. Had rhythm monitors showing low burden in 2021 and 2022.    Frequency of episodes increased about 2 months ago, but has since subsided. Estimates 2 PACs per minute. Describes this as a \"hiccup\" or \"pull\" feeling in his chest. No associated chest pain, dyspnea or presyncopal sensation. When distracted with something else (e.g., work), doesn't notice the palpitations as often. Episodes are sometimes associated w/ exertion, sometimes w/ rest.    Tolerates 2 flights of stairs. [place] more winded during Ramadan, but this resolved.    Denies recent nicotine, EtOH or illicit drug use. Caffeinated beverages 1-2/d. Snores when sleeping in supine position. Denies witnessed apnea w/ sleep or waking w/ dyspnea. Mom has sleep apnea.    Denies hx heart disease. Multiple episodes of syncope (earliest in 9th grade when standing up quickly, latest in 2023 during spirometry).    Denies FH arrhythmia or pacemaker use.    -ECG  -labs  -sleep study  -given hx of multiple syncopal episodes, will check echo  -given 2 reassuring rhythm monitors in past, will not recheck unless frequency increases       Orders Placed This Encounter   Procedures    CBC with platelets    Basic metabolic panel  (Ca, Cl, CO2, Creat, Gluc, K, Na, BUN)    TSH with free T4 reflex    Adult Sleep Eval & Management  Referral    EKG 12-lead complete w/read - Clinics    Echocardiogram Complete       ----  Chief complaint: PAC    Social History     Social History Narrative    -Grew up in [place]    -Lives with wife    Has 8 kids (born around [id number]) from 2 prior marriages.  [place] live in the [place]    -Works with [employer] at [employer].  Previously worked as  at [employer]    -Name is pronounced like \"Rubio-mur\"     Job intense, but ok overall. Worries about his kids; son in 10th grade and daughter in 11th grade with truancy issues. Custody " agreement limits his interaction w/ them. Isabel has been spending more time with his older kids.    Exam  /85 (BP Location: Right arm, Patient Position: Sitting, Cuff Size: Adult Large)   Pulse 79   Temp 98.2  F (36.8  C) (Temporal)   Resp 10   Ht 1.829 m (6')   Wt 107 kg (236 lb)   SpO2 99%   BMI 32.01 kg/m      Wt Readings from Last 2 Encounters:   04/21/25 107 kg (236 lb)   05/10/24 105.5 kg (232 lb 8 oz)     Mallampati 3+, oropharynx without abnormal masses  No thyromegaly  lung fields CTAB  heart with regular rate and rhythm, no murmurs  no lower leg edema

## 2025-04-22 LAB
ANION GAP SERPL CALCULATED.3IONS-SCNC: 11 MMOL/L (ref 7–15)
BUN SERPL-MCNC: 16.3 MG/DL (ref 6–20)
CALCIUM SERPL-MCNC: 9.4 MG/DL (ref 8.8–10.4)
CHLORIDE SERPL-SCNC: 105 MMOL/L (ref 98–107)
CREAT SERPL-MCNC: 0.94 MG/DL (ref 0.67–1.17)
EGFRCR SERPLBLD CKD-EPI 2021: >90 ML/MIN/1.73M2
GLUCOSE SERPL-MCNC: 94 MG/DL (ref 70–99)
HCO3 SERPL-SCNC: 24 MMOL/L (ref 22–29)
POTASSIUM SERPL-SCNC: 3.8 MMOL/L (ref 3.4–5.3)
SODIUM SERPL-SCNC: 140 MMOL/L (ref 135–145)
TSH SERPL DL<=0.005 MIU/L-ACNC: 1.92 UIU/ML (ref 0.3–4.2)

## 2025-05-09 ENCOUNTER — ANCILLARY PROCEDURE (OUTPATIENT)
Dept: CARDIOLOGY | Facility: CLINIC | Age: 45
End: 2025-05-09
Attending: FAMILY MEDICINE
Payer: COMMERCIAL

## 2025-05-09 DIAGNOSIS — R00.2 PALPITATIONS: ICD-10-CM

## 2025-05-09 LAB — LVEF ECHO: NORMAL

## 2025-07-15 DIAGNOSIS — K21.9 GASTROESOPHAGEAL REFLUX DISEASE WITHOUT ESOPHAGITIS: ICD-10-CM

## 2025-07-15 RX ORDER — OMEPRAZOLE 10 MG/1
10 CAPSULE, DELAYED RELEASE ORAL DAILY
Qty: 90 CAPSULE | Refills: 1 | Status: SHIPPED | OUTPATIENT
Start: 2025-07-15

## 2025-07-17 SDOH — HEALTH STABILITY: PHYSICAL HEALTH: ON AVERAGE, HOW MANY DAYS PER WEEK DO YOU ENGAGE IN MODERATE TO STRENUOUS EXERCISE (LIKE A BRISK WALK)?: 3 DAYS

## 2025-07-17 SDOH — HEALTH STABILITY: PHYSICAL HEALTH: ON AVERAGE, HOW MANY MINUTES DO YOU ENGAGE IN EXERCISE AT THIS LEVEL?: 30 MIN

## 2025-07-17 ASSESSMENT — SOCIAL DETERMINANTS OF HEALTH (SDOH): HOW OFTEN DO YOU GET TOGETHER WITH FRIENDS OR RELATIVES?: ONCE A WEEK

## 2025-07-21 ENCOUNTER — OFFICE VISIT (OUTPATIENT)
Dept: FAMILY MEDICINE | Facility: CLINIC | Age: 45
End: 2025-07-21
Attending: FAMILY MEDICINE
Payer: COMMERCIAL

## 2025-07-21 VITALS
DIASTOLIC BLOOD PRESSURE: 74 MMHG | OXYGEN SATURATION: 98 % | TEMPERATURE: 97.7 F | HEART RATE: 68 BPM | BODY MASS INDEX: 32.37 KG/M2 | HEIGHT: 72 IN | WEIGHT: 239 LBS | RESPIRATION RATE: 16 BRPM | SYSTOLIC BLOOD PRESSURE: 118 MMHG

## 2025-07-21 DIAGNOSIS — K76.0 FATTY LIVER: ICD-10-CM

## 2025-07-21 DIAGNOSIS — B35.1 ONYCHOMYCOSIS: ICD-10-CM

## 2025-07-21 DIAGNOSIS — I49.1 PAC (PREMATURE ATRIAL CONTRACTION): ICD-10-CM

## 2025-07-21 DIAGNOSIS — J30.89 NON-SEASONAL ALLERGIC RHINITIS, UNSPECIFIED TRIGGER: ICD-10-CM

## 2025-07-21 DIAGNOSIS — Z00.00 VISIT FOR PREVENTIVE HEALTH EXAMINATION: Primary | ICD-10-CM

## 2025-07-21 DIAGNOSIS — E78.1 HYPERTRIGLYCERIDEMIA: ICD-10-CM

## 2025-07-21 DIAGNOSIS — R22.31 MASS OF HAND, RIGHT: ICD-10-CM

## 2025-07-21 LAB
ALBUMIN SERPL BCG-MCNC: 4.5 G/DL (ref 3.5–5.2)
ALP SERPL-CCNC: 68 U/L (ref 40–150)
ALT SERPL W P-5'-P-CCNC: 131 U/L (ref 0–70)
ANION GAP SERPL CALCULATED.3IONS-SCNC: 11 MMOL/L (ref 7–15)
AST SERPL W P-5'-P-CCNC: 55 U/L (ref 0–45)
BILIRUB SERPL-MCNC: 0.6 MG/DL
BUN SERPL-MCNC: 15.8 MG/DL (ref 6–20)
CALCIUM SERPL-MCNC: 9.4 MG/DL (ref 8.8–10.4)
CHLORIDE SERPL-SCNC: 104 MMOL/L (ref 98–107)
CHOLEST SERPL-MCNC: 217 MG/DL
CREAT SERPL-MCNC: 1.12 MG/DL (ref 0.67–1.17)
EGFRCR SERPLBLD CKD-EPI 2021: 83 ML/MIN/1.73M2
FASTING STATUS PATIENT QL REPORTED: NO
FASTING STATUS PATIENT QL REPORTED: NO
GLUCOSE SERPL-MCNC: 90 MG/DL (ref 70–99)
HCO3 SERPL-SCNC: 23 MMOL/L (ref 22–29)
HDLC SERPL-MCNC: 32 MG/DL
HOLD SPECIMEN: NORMAL
LDLC SERPL CALC-MCNC: 127 MG/DL
NONHDLC SERPL-MCNC: 185 MG/DL
POTASSIUM SERPL-SCNC: 3.9 MMOL/L (ref 3.4–5.3)
PROT SERPL-MCNC: 7.3 G/DL (ref 6.4–8.3)
SODIUM SERPL-SCNC: 138 MMOL/L (ref 135–145)
TRIGL SERPL-MCNC: 290 MG/DL

## 2025-07-21 PROCEDURE — 90471 IMMUNIZATION ADMIN: CPT | Performed by: FAMILY MEDICINE

## 2025-07-21 PROCEDURE — 80053 COMPREHEN METABOLIC PANEL: CPT | Performed by: FAMILY MEDICINE

## 2025-07-21 PROCEDURE — 80061 LIPID PANEL: CPT | Performed by: FAMILY MEDICINE

## 2025-07-21 PROCEDURE — 99213 OFFICE O/P EST LOW 20 MIN: CPT | Mod: 25 | Performed by: FAMILY MEDICINE

## 2025-07-21 PROCEDURE — 3078F DIAST BP <80 MM HG: CPT | Performed by: FAMILY MEDICINE

## 2025-07-21 PROCEDURE — 36415 COLL VENOUS BLD VENIPUNCTURE: CPT | Performed by: FAMILY MEDICINE

## 2025-07-21 PROCEDURE — 3074F SYST BP LT 130 MM HG: CPT | Performed by: FAMILY MEDICINE

## 2025-07-21 PROCEDURE — 3049F LDL-C 100-129 MG/DL: CPT | Performed by: FAMILY MEDICINE

## 2025-07-21 PROCEDURE — 99396 PREV VISIT EST AGE 40-64: CPT | Mod: 25 | Performed by: FAMILY MEDICINE

## 2025-07-21 PROCEDURE — 90677 PCV20 VACCINE IM: CPT | Performed by: FAMILY MEDICINE

## 2025-07-21 RX ORDER — CICLOPIROX 80 MG/ML
SOLUTION TOPICAL
Qty: 6.6 ML | Refills: 11 | Status: SHIPPED | OUTPATIENT
Start: 2025-07-21

## 2025-07-21 RX ORDER — CETIRIZINE HYDROCHLORIDE 10 MG/1
10 TABLET ORAL DAILY
Qty: 90 TABLET | Refills: 3 | Status: SHIPPED | OUTPATIENT
Start: 2025-07-21

## 2025-07-21 NOTE — PROGRESS NOTES
Assessment/Plan    Social.  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  Younger children continue to struggle with truancy.  Patient called CPS, but reports they are unable to intervene.    PACs.  Echocardiogram is reassuring.  Frequency has been decreasing.  No recent presyncopal sensation or exercise restrictions due to PACs.  Patient plans to complete home sleep test.    Swelling/lump of right hand.  Dorsum of hand between MCPs 1 and 2.  Onset a few months ago.  Size stable.  Painful at times.  No obvious injury.  Perhaps overuse related to computer at work, phone at work, video games.  Patient is right-handed.  He has never had hand surgery.  He has tried a brace.  On exam, lump does not seem consistent with ganglion cyst.  Etiology unclear.  Will refer to hand specialist.    Chronic nasal congestion.  Reports that several years ago a sinus CT at [facility] was abnormal.  Patient reports being told that symptoms were most likely secondary to dust mite allergy.  Symptoms are present year-round.  Congestion occasionally interferes with sleep.  Nasal saline helps.  Uses Flonase intermittently, but using it for more than 1 week has led to nosebleeds.  Will start cetirizine 10 mg daily.    Preventative.  Denies STI concerns.  Denies substance use concerns.  Given history of colon polyp in father, I recommended early screening colonoscopy.  Patient prefers to wait until age 46.    Next visit in 1 year for preventive.  Trend weight.    Orders Placed This Encounter   Procedures    Pneumococcal 20 Valent Conjugate (Prevnar 20)    Lipid panel reflex to direct LDL Non-fasting    Comprehensive metabolic panel (BMP + Alb, Alk Phos, ALT, AST, Total. Bili, TP)    Extra Purple Top EDTA (LAB USE ONLY)    Orthopedic  Referral       ----  Chief complaint: Physical    Social History     Social History Narrative    -Grew up in [place]    -Lives with wife    Has 8 kids (born around [id number]) from 2 prior marriages.  K[place]  [place]live in the [place]    -Works with [employer] at [employer].  Previously worked as  at [employer]    -Name is pronounced like \"Rubio-mur\"    -[employer], video games     Patient has been advised of potential for split billing.    Exam  /74 (BP Location: Right arm, Patient Position: Sitting, Cuff Size: Adult Regular)   Pulse 68   Temp 97.7  F (36.5  C) (Temporal)   Resp 16   Ht 1.824 m (5' 11.81\")   Wt 108.4 kg (239 lb)   SpO2 98%   BMI 32.59 kg/m      Wt Readings from Last 2 Encounters:   07/21/25 108.4 kg (239 lb)   07/11/25 104.3 kg (230 lb)     Mallampati 3+  Right tympanic membrane appears normal, left tympanic membrane with possible small effusion  Inflamed nasal mucosa bilaterally    lung fields CTAB  heart with regular rate and rhythm, no murmurs  no lower leg edema    Soft lump on dorsum of right hand between MCPs 1 and 2.  Mildly tender.  Good strength with thumb opposition.  Good finger range of motion  "

## 2025-07-22 ENCOUNTER — PATIENT OUTREACH (OUTPATIENT)
Dept: CARE COORDINATION | Facility: CLINIC | Age: 45
End: 2025-07-22
Payer: COMMERCIAL

## 2025-07-22 PROBLEM — H65.493 CHRONIC OTITIS MEDIA OF BOTH EARS WITH EFFUSION: Status: RESOLVED | Noted: 2023-02-23 | Resolved: 2025-07-22

## 2025-07-22 PROBLEM — Z00.00 VISIT FOR PREVENTIVE HEALTH EXAMINATION: Status: ACTIVE | Noted: 2025-07-22

## 2025-07-24 ENCOUNTER — PATIENT OUTREACH (OUTPATIENT)
Dept: CARE COORDINATION | Facility: CLINIC | Age: 45
End: 2025-07-24
Payer: COMMERCIAL

## 2025-08-11 ENCOUNTER — PRE VISIT (OUTPATIENT)
Dept: ORTHOPEDICS | Facility: CLINIC | Age: 45
End: 2025-08-11

## 2025-08-12 DIAGNOSIS — M79.641 RIGHT HAND PAIN: Primary | ICD-10-CM

## 2025-08-18 ENCOUNTER — ANCILLARY PROCEDURE (OUTPATIENT)
Dept: GENERAL RADIOLOGY | Facility: CLINIC | Age: 45
End: 2025-08-18
Attending: FAMILY MEDICINE
Payer: COMMERCIAL

## 2025-08-18 ENCOUNTER — OFFICE VISIT (OUTPATIENT)
Dept: ORTHOPEDICS | Facility: CLINIC | Age: 45
End: 2025-08-18
Attending: FAMILY MEDICINE
Payer: COMMERCIAL

## 2025-08-18 DIAGNOSIS — R22.31 MASS OF HAND, RIGHT: ICD-10-CM

## 2025-08-18 PROCEDURE — 99203 OFFICE O/P NEW LOW 30 MIN: CPT | Performed by: FAMILY MEDICINE

## 2025-08-18 PROCEDURE — 73130 X-RAY EXAM OF HAND: CPT | Mod: RT | Performed by: RADIOLOGY

## (undated) DEVICE — SU ETHILON 3-0 PS-1 18" 1663H

## (undated) DEVICE — TUBING SYSTEM ARTHREX PATIENT REDEUCE AR-6421

## (undated) DEVICE — PAD ARMBOARD FOAM EGGCRATE COVIDEN 3114367

## (undated) DEVICE — GLOVE BIOGEL PI MICRO INDICATOR UNDERGLOVE SZ 8.0 48980

## (undated) DEVICE — LINEN DRAPE 54X72" 5467

## (undated) DEVICE — LINEN TOWEL PACK X5 5464

## (undated) DEVICE — GLOVE BIOGEL PI MICRO SZ 8.0 48580

## (undated) DEVICE — SUCTION MANIFOLD NEPTUNE 2 SYS 4 PORT 0702-020-000

## (undated) DEVICE — PREP CHLORAPREP 26ML TINTED ORANGE  260815

## (undated) DEVICE — SOL NACL 0.9% IRRIG 3000ML BAG 2B7477

## (undated) DEVICE — BUR ARTHREX COOLCUT SABRE 4.0MMX13CM AR-8400SR

## (undated) DEVICE — PACK ARTHROSCOPY CUSTOM ASC

## (undated) RX ORDER — DEXAMETHASONE SODIUM PHOSPHATE 4 MG/ML
INJECTION, SOLUTION INTRA-ARTICULAR; INTRALESIONAL; INTRAMUSCULAR; INTRAVENOUS; SOFT TISSUE
Status: DISPENSED
Start: 2023-03-15

## (undated) RX ORDER — OXYCODONE HYDROCHLORIDE 5 MG/1
TABLET ORAL
Status: DISPENSED
Start: 2023-03-15

## (undated) RX ORDER — FENTANYL CITRATE 50 UG/ML
INJECTION, SOLUTION INTRAMUSCULAR; INTRAVENOUS
Status: DISPENSED
Start: 2023-03-15

## (undated) RX ORDER — KETOROLAC TROMETHAMINE 30 MG/ML
INJECTION, SOLUTION INTRAMUSCULAR; INTRAVENOUS
Status: DISPENSED
Start: 2023-03-15

## (undated) RX ORDER — SIMETHICONE 40MG/0.6ML
SUSPENSION, DROPS(FINAL DOSAGE FORM)(ML) ORAL
Status: DISPENSED
Start: 2023-03-16

## (undated) RX ORDER — EPINEPHRINE 1 MG/ML
INJECTION, SOLUTION INTRAMUSCULAR; SUBCUTANEOUS
Status: DISPENSED
Start: 2023-03-15

## (undated) RX ORDER — CEFAZOLIN SODIUM 2 G/50ML
SOLUTION INTRAVENOUS
Status: DISPENSED
Start: 2023-03-15

## (undated) RX ORDER — PROPOFOL 10 MG/ML
INJECTION, EMULSION INTRAVENOUS
Status: DISPENSED
Start: 2023-03-15

## (undated) RX ORDER — GABAPENTIN 300 MG/1
CAPSULE ORAL
Status: DISPENSED
Start: 2023-03-15

## (undated) RX ORDER — HYDROMORPHONE HYDROCHLORIDE 1 MG/ML
INJECTION, SOLUTION INTRAMUSCULAR; INTRAVENOUS; SUBCUTANEOUS
Status: DISPENSED
Start: 2023-03-15

## (undated) RX ORDER — SIMETHICONE 40MG/0.6ML
SUSPENSION, DROPS(FINAL DOSAGE FORM)(ML) ORAL
Status: DISPENSED
Start: 2023-03-17

## (undated) RX ORDER — SIMETHICONE 40MG/0.6ML
SUSPENSION, DROPS(FINAL DOSAGE FORM)(ML) ORAL
Status: DISPENSED
Start: 2023-03-15

## (undated) RX ORDER — ALBUTEROL SULFATE 0.83 MG/ML
SOLUTION RESPIRATORY (INHALATION)
Status: DISPENSED
Start: 2023-02-08

## (undated) RX ORDER — ONDANSETRON 2 MG/ML
INJECTION INTRAMUSCULAR; INTRAVENOUS
Status: DISPENSED
Start: 2023-03-15

## (undated) RX ORDER — BUPIVACAINE HYDROCHLORIDE 2.5 MG/ML
INJECTION, SOLUTION EPIDURAL; INFILTRATION; INTRACAUDAL
Status: DISPENSED
Start: 2023-03-15

## (undated) RX ORDER — ACETAMINOPHEN 325 MG/1
TABLET ORAL
Status: DISPENSED
Start: 2023-03-15